# Patient Record
Sex: MALE | Race: WHITE | NOT HISPANIC OR LATINO
[De-identification: names, ages, dates, MRNs, and addresses within clinical notes are randomized per-mention and may not be internally consistent; named-entity substitution may affect disease eponyms.]

---

## 2017-10-23 PROBLEM — Z00.00 ENCOUNTER FOR PREVENTIVE HEALTH EXAMINATION: Status: ACTIVE | Noted: 2017-10-23

## 2017-11-01 ENCOUNTER — APPOINTMENT (OUTPATIENT)
Dept: NEUROLOGY | Facility: CLINIC | Age: 57
End: 2017-11-01
Payer: MEDICAID

## 2017-11-01 VITALS
BODY MASS INDEX: 29.99 KG/M2 | SYSTOLIC BLOOD PRESSURE: 140 MMHG | DIASTOLIC BLOOD PRESSURE: 90 MMHG | WEIGHT: 180 LBS | HEIGHT: 65 IN

## 2017-11-01 DIAGNOSIS — Z86.39 PERSONAL HISTORY OF OTHER ENDOCRINE, NUTRITIONAL AND METABOLIC DISEASE: ICD-10-CM

## 2017-11-01 DIAGNOSIS — Z87.891 PERSONAL HISTORY OF NICOTINE DEPENDENCE: ICD-10-CM

## 2017-11-01 DIAGNOSIS — Z78.9 OTHER SPECIFIED HEALTH STATUS: ICD-10-CM

## 2017-11-01 PROCEDURE — 99213 OFFICE O/P EST LOW 20 MIN: CPT

## 2017-11-01 RX ORDER — SIMVASTATIN 40 MG/1
40 TABLET, FILM COATED ORAL
Refills: 0 | Status: ACTIVE | COMMUNITY

## 2018-05-02 ENCOUNTER — INPATIENT (INPATIENT)
Facility: HOSPITAL | Age: 58
LOS: 6 days | Discharge: NURSING, REHAB OR LONG-TERM CARE | End: 2018-05-09
Attending: NEUROLOGICAL SURGERY | Admitting: NEUROLOGICAL SURGERY
Payer: MEDICAID

## 2018-05-02 VITALS
HEIGHT: 69 IN | TEMPERATURE: 98 F | DIASTOLIC BLOOD PRESSURE: 100 MMHG | RESPIRATION RATE: 16 BRPM | OXYGEN SATURATION: 100 % | WEIGHT: 175.05 LBS | HEART RATE: 91 BPM | SYSTOLIC BLOOD PRESSURE: 141 MMHG

## 2018-05-02 DIAGNOSIS — G61.82 MULTIFOCAL MOTOR NEUROPATHY: ICD-10-CM

## 2018-05-02 DIAGNOSIS — E78.5 HYPERLIPIDEMIA, UNSPECIFIED: ICD-10-CM

## 2018-05-02 DIAGNOSIS — G47.01 INSOMNIA DUE TO MEDICAL CONDITION: ICD-10-CM

## 2018-05-02 DIAGNOSIS — I62.9 NONTRAUMATIC INTRACRANIAL HEMORRHAGE, UNSPECIFIED: ICD-10-CM

## 2018-05-02 DIAGNOSIS — F32.9 MAJOR DEPRESSIVE DISORDER, SINGLE EPISODE, UNSPECIFIED: ICD-10-CM

## 2018-05-02 LAB
ABO RH CONFIRMATION: SIGNIFICANT CHANGE UP
ALBUMIN SERPL ELPH-MCNC: 2.6 G/DL — LOW (ref 3.3–5)
ALP SERPL-CCNC: 77 U/L — SIGNIFICANT CHANGE UP (ref 40–120)
ALT FLD-CCNC: 68 U/L — SIGNIFICANT CHANGE UP (ref 12–78)
ANION GAP SERPL CALC-SCNC: 9 MMOL/L — SIGNIFICANT CHANGE UP (ref 5–17)
APTT BLD: 29.1 SEC — SIGNIFICANT CHANGE UP (ref 27.5–37.4)
AST SERPL-CCNC: 55 U/L — HIGH (ref 15–37)
BASOPHILS # BLD AUTO: 0.05 K/UL — SIGNIFICANT CHANGE UP (ref 0–0.2)
BASOPHILS NFR BLD AUTO: 0.4 % — SIGNIFICANT CHANGE UP (ref 0–2)
BILIRUB SERPL-MCNC: 0.3 MG/DL — SIGNIFICANT CHANGE UP (ref 0.2–1.2)
BLD GP AB SCN SERPL QL: SIGNIFICANT CHANGE UP
BUN SERPL-MCNC: 24 MG/DL — HIGH (ref 7–23)
CALCIUM SERPL-MCNC: 8.7 MG/DL — SIGNIFICANT CHANGE UP (ref 8.5–10.1)
CHLORIDE SERPL-SCNC: 102 MMOL/L — SIGNIFICANT CHANGE UP (ref 96–108)
CO2 SERPL-SCNC: 27 MMOL/L — SIGNIFICANT CHANGE UP (ref 22–31)
CREAT SERPL-MCNC: 0.78 MG/DL — SIGNIFICANT CHANGE UP (ref 0.5–1.3)
EOSINOPHIL # BLD AUTO: 0.12 K/UL — SIGNIFICANT CHANGE UP (ref 0–0.5)
EOSINOPHIL NFR BLD AUTO: 0.9 % — SIGNIFICANT CHANGE UP (ref 0–6)
GLUCOSE SERPL-MCNC: 177 MG/DL — HIGH (ref 70–99)
HCT VFR BLD CALC: 47.2 % — SIGNIFICANT CHANGE UP (ref 39–50)
HGB BLD-MCNC: 15.5 G/DL — SIGNIFICANT CHANGE UP (ref 13–17)
IMM GRANULOCYTES NFR BLD AUTO: 0.3 % — SIGNIFICANT CHANGE UP (ref 0–1.5)
INR BLD: 1 RATIO — SIGNIFICANT CHANGE UP (ref 0.88–1.16)
LYMPHOCYTES # BLD AUTO: 17 % — SIGNIFICANT CHANGE UP (ref 13–44)
LYMPHOCYTES # BLD AUTO: 2.3 K/UL — SIGNIFICANT CHANGE UP (ref 1–3.3)
MAGNESIUM SERPL-MCNC: 2 MG/DL — SIGNIFICANT CHANGE UP (ref 1.6–2.6)
MCHC RBC-ENTMCNC: 29.8 PG — SIGNIFICANT CHANGE UP (ref 27–34)
MCHC RBC-ENTMCNC: 32.8 GM/DL — SIGNIFICANT CHANGE UP (ref 32–36)
MCV RBC AUTO: 90.6 FL — SIGNIFICANT CHANGE UP (ref 80–100)
MONOCYTES # BLD AUTO: 1.31 K/UL — HIGH (ref 0–0.9)
MONOCYTES NFR BLD AUTO: 9.7 % — SIGNIFICANT CHANGE UP (ref 2–14)
NEUTROPHILS # BLD AUTO: 9.69 K/UL — HIGH (ref 1.8–7.4)
NEUTROPHILS NFR BLD AUTO: 71.7 % — SIGNIFICANT CHANGE UP (ref 43–77)
NRBC # BLD: 0 /100 WBCS — SIGNIFICANT CHANGE UP (ref 0–0)
PLATELET # BLD AUTO: 306 K/UL — SIGNIFICANT CHANGE UP (ref 150–400)
POTASSIUM SERPL-MCNC: 3.6 MMOL/L — SIGNIFICANT CHANGE UP (ref 3.5–5.3)
POTASSIUM SERPL-SCNC: 3.6 MMOL/L — SIGNIFICANT CHANGE UP (ref 3.5–5.3)
PROT SERPL-MCNC: 9.9 GM/DL — HIGH (ref 6–8.3)
PROTHROM AB SERPL-ACNC: 10.8 SEC — SIGNIFICANT CHANGE UP (ref 9.8–12.7)
RBC # BLD: 5.21 M/UL — SIGNIFICANT CHANGE UP (ref 4.2–5.8)
RBC # FLD: 12.7 % — SIGNIFICANT CHANGE UP (ref 10.3–14.5)
SODIUM SERPL-SCNC: 138 MMOL/L — SIGNIFICANT CHANGE UP (ref 135–145)
TROPONIN I SERPL-MCNC: 0.02 NG/ML — SIGNIFICANT CHANGE UP (ref 0.01–0.04)
TYPE + AB SCN PNL BLD: SIGNIFICANT CHANGE UP
WBC # BLD: 13.51 K/UL — HIGH (ref 3.8–10.5)
WBC # FLD AUTO: 13.51 K/UL — HIGH (ref 3.8–10.5)

## 2018-05-02 PROCEDURE — 70496 CT ANGIOGRAPHY HEAD: CPT | Mod: 26

## 2018-05-02 PROCEDURE — 93010 ELECTROCARDIOGRAM REPORT: CPT

## 2018-05-02 PROCEDURE — 71045 X-RAY EXAM CHEST 1 VIEW: CPT | Mod: 26,77

## 2018-05-02 PROCEDURE — 71045 X-RAY EXAM CHEST 1 VIEW: CPT | Mod: 26

## 2018-05-02 PROCEDURE — 99285 EMERGENCY DEPT VISIT HI MDM: CPT

## 2018-05-02 PROCEDURE — 70450 CT HEAD/BRAIN W/O DYE: CPT | Mod: 26,59

## 2018-05-02 PROCEDURE — 70450 CT HEAD/BRAIN W/O DYE: CPT | Mod: 26,77,59

## 2018-05-02 PROCEDURE — 99223 1ST HOSP IP/OBS HIGH 75: CPT

## 2018-05-02 RX ORDER — SENNA PLUS 8.6 MG/1
2 TABLET ORAL AT BEDTIME
Qty: 0 | Refills: 0 | Status: DISCONTINUED | OUTPATIENT
Start: 2018-05-02 | End: 2018-05-09

## 2018-05-02 RX ORDER — ACETAMINOPHEN 500 MG
325 TABLET ORAL EVERY 4 HOURS
Qty: 0 | Refills: 0 | Status: DISCONTINUED | OUTPATIENT
Start: 2018-05-02 | End: 2018-05-09

## 2018-05-02 RX ORDER — ONDANSETRON 8 MG/1
4 TABLET, FILM COATED ORAL EVERY 6 HOURS
Qty: 0 | Refills: 0 | Status: DISCONTINUED | OUTPATIENT
Start: 2018-05-02 | End: 2018-05-09

## 2018-05-02 RX ORDER — NICARDIPINE HYDROCHLORIDE 30 MG/1
5 CAPSULE, EXTENDED RELEASE ORAL
Qty: 40 | Refills: 0 | Status: DISCONTINUED | OUTPATIENT
Start: 2018-05-02 | End: 2018-05-05

## 2018-05-02 RX ORDER — SODIUM CHLORIDE 9 MG/ML
1000 INJECTION INTRAMUSCULAR; INTRAVENOUS; SUBCUTANEOUS
Qty: 0 | Refills: 0 | Status: DISCONTINUED | OUTPATIENT
Start: 2018-05-02 | End: 2018-05-04

## 2018-05-02 RX ORDER — DOCUSATE SODIUM 100 MG
100 CAPSULE ORAL THREE TIMES A DAY
Qty: 0 | Refills: 0 | Status: DISCONTINUED | OUTPATIENT
Start: 2018-05-02 | End: 2018-05-09

## 2018-05-02 RX ORDER — MIRTAZAPINE 45 MG/1
30 TABLET, ORALLY DISINTEGRATING ORAL AT BEDTIME
Qty: 0 | Refills: 0 | Status: DISCONTINUED | OUTPATIENT
Start: 2018-05-02 | End: 2018-05-09

## 2018-05-02 RX ORDER — IMMUNE GLOBULIN,GAMMA(IGG) 5 %
0 VIAL (ML) INTRAVENOUS
Qty: 0 | Refills: 0 | COMMUNITY

## 2018-05-02 RX ORDER — PANTOPRAZOLE SODIUM 20 MG/1
40 TABLET, DELAYED RELEASE ORAL DAILY
Qty: 0 | Refills: 0 | Status: DISCONTINUED | OUTPATIENT
Start: 2018-05-02 | End: 2018-05-09

## 2018-05-02 RX ORDER — FOLIC ACID 0.8 MG
1 TABLET ORAL DAILY
Qty: 0 | Refills: 0 | Status: DISCONTINUED | OUTPATIENT
Start: 2018-05-02 | End: 2018-05-09

## 2018-05-02 RX ORDER — SIMVASTATIN 20 MG/1
20 TABLET, FILM COATED ORAL AT BEDTIME
Qty: 0 | Refills: 0 | Status: DISCONTINUED | OUTPATIENT
Start: 2018-05-02 | End: 2018-05-03

## 2018-05-02 RX ADMIN — SODIUM CHLORIDE 75 MILLILITER(S): 9 INJECTION INTRAMUSCULAR; INTRAVENOUS; SUBCUTANEOUS at 21:57

## 2018-05-02 NOTE — CONSULT NOTE ADULT - SUBJECTIVE AND OBJECTIVE BOX
The patient is a 58 male with a ? upper motor neuron dieease, an Immunodiffency he recieves IVIG for [id number], HLD who was weaker than normal starting this past sunday.  He was sent in by an outpatient physician.  CTH showed a L ICH with IVH.  PAtient feels he may be slightly mor weaker on the Right and is more confused      PMH: As above  PSH: He denies  SH: Smoker  FH: NC  ALL: NKDA

## 2018-05-02 NOTE — H&P ADULT - HISTORY OF PRESENT ILLNESS
57 y/o male with PMHx of 'MultiFocal Motor Neuropathy' Dx'd in 1992, HLD, Depression.  Pt presents to the ED with word finding difficulties, recent fall at home, and increased Right sided weakness.  Pt states he fell Sunday, denies hitting head or LOC but believes this is when his symptoms began with word finding, gait dysfunction, and increased balance problems. Pt noted to have increased right sided weakness, + difficulty word finding, +imbalance per Aide who takes care of patient starting on Mondays for 4 hours and also when he visited his psychiatrist this afternoon on 5/2/2018.  Pt sent to ED for evaluation.  CTH demonstrates L Thalamic ICH, with intraventricular extension - no hydrocephalus.    Hemorrhagic Stroke : NIHSS of 4  ICH Score : 1 (due to Intraventricular hemorrhage)    PAST MEDICAL & SURGICAL HISTORY:  Multifocal Motor Neuropathy - q 10 days IVIG infusions (2 day dosing) - Improves and maintains patients motor power - ambulatory status.  Hyperlipidemia  Depression  Hx of Right Wrist Fracture  Hx of Right foot/ankle fracture    FAMILY HISTORY:  Noncontributory     Social Hx:  Nonsmoker, no drug or alcohol use  Pt required Home care aide / services (4 hours per day) 5 days a week.  Brother A&Well  Mother Hx of Lymphoma recently treated  Father with spinal tumor, paraplegia / presently hospitalized.    Allergies  No Known Allergies      MEDICATIONS  (STANDING):  LORazepam   Injectable 2 milliGRAM(s) IV Push Once  niCARdipine Infusion 5 mG/Hr (25 mL/Hr) IV Continuous <Continuous>    MEDICATIONS (OUTPATIENT)  IVIG INFUSION Q 10 DAYS (2 DAY DOSING REGIMENT)  SIMVASTATIN 20 MG DAILY  REMERON 3MG QHS  ANTI-DEPRESSENT (10MG QHS)  LIPOFLAVINOIDE         ROS: Pertinent positives in HPI, all other ROS negative    Vital Signs Last 24 Hrs  T(C): 36.5 (02 May 2018 14:09), Max: 36.5 (02 May 2018 14:09)  T(F): 97.7 (02 May 2018 14:09), Max: 97.7 (02 May 2018 14:09)  HR: 82 (02 May 2018 15:45) (82 - 91)  BP: 141/94 (02 May 2018 15:50) (137/82 - 167/87)  BP(mean): --  RR: 16 (02 May 2018 14:09) (16 - 16)  SpO2: 100% (02 May 2018 14:09) (100% - 100%)    Labs:                        15.5   13.51 )-----------( 306      ( 02 May 2018 14:00 )             47.2     05-02    138  |  102  |  24<H>  ----------------------------<  177<H>  3.6   |  27  |  0.78    Ca    8.7      02 May 2018 14:00  Mg     2.0     05-02    TPro  9.9<H>  /  Alb  2.6<L>  /  TBili  0.3  /  DBili  x   /  AST  55<H>  /  ALT  68  /  AlkPhos  77  05-02    Coags pending:     Radiology report:  - CT head: Left Thalamic ICH with IVH. No hydro  - CTA brain: no avm or vascular malformation / no aneurysm.    Physical Exam:  Constitutional: Awake / alert, repeats easily & clearly.  Difficulty naming objects 'Pen,Cup,Mouse'  HEENT: PERRLA, EOMI  Neck: Supple  Respiratory: Breath sounds are clear bilaterally  Cardiovascular: S1 and S2, regular rhythm  Gastrointestinal: Soft, NT/ND  Extremities:  no edema  Vascular: No carotid Bruit  Musculoskeletal: no joint swelling/tenderness, no abnormal movements  Skin: No rashes    Neurological Exam:  HF: A x Awake, Orientation x 3 with guidance. Appropriately interactive, normal affect, speech fluent, + cortical aphasia with word finding difficulties. Naming /repetition intact . Pt able to follow complex two step commands.  CN: PERRL, EOMI, VFF, facial sensation normal, no NLFD, tongue midline  Motor:   RUE Delt 4/5, Bic/Tric 3+/5. Wrist drop, 1/5 WF/WE,/Intrins secondary to Motor neuron dz.  LUE Delt 5/5, Bic/Tric 4/5. Wrist drop, 1/5 WF/WE,/Intrins secondary to Motor neuron dz.  B/L LE's: 4/5 power throughout / IP/H/Q/DF/PF  Sens: Intact to light touch  Coord:  LUE thumb to ear without difficulty. Poor coord w/ RUE secondary to ICH.   Gait/Balance: Cannot test    A/P: 58 M with Mutlifocal Motor Neuropathy, and acute Left Thalamic ICH with intraventricular extension, presumed to have began on 4/29.  Pt evaluated by psychiatrist who noticed word finding issues / gait decline and sent to ED to r/o CVA.  Pt noted to have Left Thalamic ICH and IVH.  Pt's NIHSS 4.    - Stat CTA to r/o vascular malformation - Negative  - Repeat CTH in 6-8 hours to ensure no progression has occurred, then repeat in AM  - Keep sbp < 160 , Cardene gtt rx'd  - No acute neurosurgical intervention  - Neurology consult recommend  - Admit to ICU via medical / hospitalist team accordingly  - HOB 30 degrees, Neuro checks q 1 hr, No antiszr meds required, avoid narcotics if c/o HA pain  - f/u coags in ED, Obtain T&Screen  - d/w Dr. Chino accordingly - will admit to ICU and monitor for next 24 hours.  - Critical Care team informed of need for admission to Unit with close monitoring of neuro status and blood pressure.

## 2018-05-02 NOTE — CONSULT NOTE ADULT - SUBJECTIVE AND OBJECTIVE BOX
NEUROSURGERY EVALUATION:    HPI:  59 y/o male with PMHx of motor neuron disease, suspect ALS, presents to the ED c/o AMS. Home health Aide states he has been having trouble finding words and imbalance since Monday, has not noticed any new weakness. Pt states he fell Sunday, denies hitting head or LOC but believes this is when his symptoms began with word finding, gait dysfunction, and increased balance problems. Pt noted to have increased right sided weakness, + difficulty word finding, +imbalance when he visited his psychiatrist this afternoon on 5/2/2018.  Pt sent to ED for evaluation.  CTH demonstrates L Thalamic ICH, with intraventricular extension - no hydrocephalus.    PAST MEDICAL & SURGICAL HISTORY:  ALS - weekly infusions per Patient.    FAMILY HISTORY:  Noncontributory     Social Hx:  Nonsmoker, no drug or alcohol use  Pt required Home care aide / services.  Allergies    No Known Allergies  Intolerances    MEDICATIONS  (STANDING):  LORazepam   Injectable 2 milliGRAM(s) IV Push Once  niCARdipine Infusion 5 mG/Hr (25 mL/Hr) IV Continuous <Continuous>       ROS: Pertinent positives in HPI, all other ROS negative    Vital Signs Last 24 Hrs  T(C): 36.5 (02 May 2018 14:09), Max: 36.5 (02 May 2018 14:09)  T(F): 97.7 (02 May 2018 14:09), Max: 97.7 (02 May 2018 14:09)  HR: 82 (02 May 2018 15:45) (82 - 91)  BP: 141/94 (02 May 2018 15:50) (137/82 - 167/87)  BP(mean): --  RR: 16 (02 May 2018 14:09) (16 - 16)  SpO2: 100% (02 May 2018 14:09) (100% - 100%)    Labs:                        15.5   13.51 )-----------( 306      ( 02 May 2018 14:00 )             47.2     05-02    138  |  102  |  24<H>  ----------------------------<  177<H>  3.6   |  27  |  0.78    Ca    8.7      02 May 2018 14:00  Mg     2.0     05-02    TPro  9.9<H>  /  Alb  2.6<L>  /  TBili  0.3  /  DBili  x   /  AST  55<H>  /  ALT  68  /  AlkPhos  77  05-02    Coags pending:     Radiology report:  - CT head: Left Thalamic ICH with IVH. No hydro    Physical Exam:  Constitutional: Awake / alert, repeats easily & clearly.  Difficulty naming objects 'Pen,Cup,Mouse'  HEENT: PERRLA, EOMI  Neck: Supple  Respiratory: Breath sounds are clear bilaterally  Cardiovascular: S1 and S2, regular rhythm  Gastrointestinal: Soft, NT/ND  Extremities:  no edema  Vascular: No carotid Bruit  Musculoskeletal: no joint swelling/tenderness, no abnormal movements  Skin: No rashes    Neurological Exam:  HF: A x Awake, Orientation x 3 with guidance. Appropriately interactive, normal affect, speech fluent, + cortical aphasia with word finding difficulties. Naming /repetition intact . Pt able to follow complex two step commands.  CN: PERRL, EOMI, VFF, facial sensation normal, no NLFD, tongue midline  Motor:   RUE Delt 4/5, Bic/Tric 3+/5. Wrist drop, 1/5 WF/WE,/Intrins secondary to Motor neuron dz.  LUE Delt 5/5, Bic/Tric 4/5. Wrist drop, 1/5 WF/WE,/Intrins secondary to Motor neuron dz.  B/L LE's: 4/5 power throughout / IP/H/Q/DF/PF  Sens: Intact to light touch  Coord:  LUE thumb to ear without difficulty. Poor coord w/ RUE secondary to ICH.   Gait/Balance: Cannot test    A/P: 58 M with motor neuro dz, suspect ALS with acute Left Thalamic ICH with intraventricular extension, presumed to have began on 4/29.  Pt evaluated by psychiatrist who noticed word finding issues / gait decline and sent to ED to r/o CVA.  Pt noted to have ICH.    - Stat CTA to r/o vascular malformation  - Repeat CTH in 6-8 hours to ensure no progression has occurred, then repeat in AM  - Keep sbp < 160 , Cardene gtt rx'd  - No acute neurosurgical intervention  - Neurology consult recommend  - Admit to ICU via medical / hospitalist team accordingly  - HOB 30 degrees, Neuro checks q 1 hr, No antiszr meds required, avoid narcotics if c/o HA pain  - f/u coags in ED, Obtain T&Screen  - d/w Dr. Chino accordingly - will follow as a consultant     Total Critical Care Time spent:

## 2018-05-02 NOTE — H&P ADULT - PROBLEM SELECTOR PLAN 2
- plan for IVIG infusion by day 10 for a 2 day course  - Next dosing regiment is 10th & 11th of May  - Neurology consult to confirm patients dosing regiment and management of MMN.

## 2018-05-02 NOTE — ED PROVIDER NOTE - MUSCULOSKELETAL, MLM
Spine appears normal, range of motion is not limited, no muscle or joint tenderness. bilat wrist and foot drop

## 2018-05-02 NOTE — ED ADULT NURSE NOTE - OBJECTIVE STATEMENT
Pt sent from MD office for word-finding problems.  Pt with motor neuropathy, treated with IVIG.  Pt alert, cooperative, difficult to understand.  Aide from [facility] at bedside.  Cardiac and VS monitoring initiated.  20g PIV placed in L wrist.

## 2018-05-02 NOTE — ED PROVIDER NOTE - MEDICAL DECISION MAKING DETAILS
CT with left thalamic hemorrhage, with IVH.  Neurosurgery Dr. Chino consulted.  Cardene drip ordered for goal BP < 160, however 's at the moment.  Signout to Dr. Mcconnell pending admission to ICU.

## 2018-05-02 NOTE — H&P ADULT - NSHPOUTPATIENTPROVIDERS_GEN_ALL_CORE
Dr. Saige Cobb MD - Psychiatrist  Dr. Gilman - Neurologist - [place]  Vikash Shaffer - [facility] Neurologist (sees 4x/year for IVIG infusion therapy)

## 2018-05-02 NOTE — ED PROVIDER NOTE - OBJECTIVE STATEMENT
59 y/o male with PMHx of neuropathy presents to the ED c/o AMS. Aide states he has been having trouble finding words and imbalance since yesterday, has not noticed any new weakness. Pt states he fell Sunday, denies hitting head or LOC.

## 2018-05-02 NOTE — CONSULT NOTE ADULT - CONSULT REASON
amyotrophic lateral sclerosis with suspected L thalamic ICH with intraventricular extension, presumed to have occurred on April 29th.

## 2018-05-02 NOTE — H&P ADULT - ASSESSMENT
A/P: 58 M with Mutlifocal Motor Neuropathy, requiring IVIG infusions q10 days for motor power preservation, and acute Left Thalamic ICH with intraventricular extension, presumed to have began on 4/29.  Pt evaluated by psychiatrist who noticed word finding issues / gait decline and sent to ED to r/o CVA.  Pt noted to have Left Thalamic ICH and IVH.  Pt's NIHSS 4.

## 2018-05-02 NOTE — ED PROVIDER NOTE - NEUROLOGICAL, MLM
Alert and oriented, no focal deficits, no motor or sensory deficits. No asphasia or dysarthria noted on exam

## 2018-05-02 NOTE — ED ADULT TRIAGE NOTE - CHIEF COMPLAINT QUOTE
Pt. to the ED BIBA C/o Weakness and confusion since this morning with inability to bring words put- Dr. Ryan to the bedside to examine patient- Co de stroke not indicated- + Psych hx-

## 2018-05-02 NOTE — H&P ADULT - PROBLEM SELECTOR PLAN 1
- Stat CTA to r/o vascular malformation - Negative  - Repeat CTH in 6-8 hours to ensure no progression has occurred, then repeat in AM  - Keep sbp < 160 , Cardene gtt rx'd  - No acute neurosurgical intervention  - Neurology consult recommend  - Admit to ICU via medical / hospitalist team accordingly  - HOB 30 degrees, Neuro checks q 1 hr, No antiszr meds required, avoid narcotics if c/o HA pain  - f/u coags in ED, Obtain T&Screen  - d/w Dr. Chino accordingly - will admit to ICU and monitor for next 24 hours.  - Critical Care team informed of need for admission to Unit with close monitoring of neuro status and blood pressure.  - PT/OT eval in am  - dispo planning (rehab vs return to home with greater care needs)

## 2018-05-03 DIAGNOSIS — G62.9 POLYNEUROPATHY, UNSPECIFIED: ICD-10-CM

## 2018-05-03 LAB
HBA1C BLD-MCNC: 8.7 % — HIGH (ref 4–5.6)
MAGNESIUM SERPL-MCNC: 2 MG/DL — SIGNIFICANT CHANGE UP (ref 1.6–2.6)
TSH SERPL-MCNC: 1.82 UU/ML — SIGNIFICANT CHANGE UP (ref 0.36–3.74)

## 2018-05-03 PROCEDURE — 99223 1ST HOSP IP/OBS HIGH 75: CPT

## 2018-05-03 PROCEDURE — 93010 ELECTROCARDIOGRAM REPORT: CPT

## 2018-05-03 PROCEDURE — 99232 SBSQ HOSP IP/OBS MODERATE 35: CPT

## 2018-05-03 PROCEDURE — 70450 CT HEAD/BRAIN W/O DYE: CPT | Mod: 26

## 2018-05-03 RX ORDER — AMLODIPINE BESYLATE 2.5 MG/1
5 TABLET ORAL DAILY
Qty: 0 | Refills: 0 | Status: DISCONTINUED | OUTPATIENT
Start: 2018-05-03 | End: 2018-05-09

## 2018-05-03 RX ORDER — LISINOPRIL 2.5 MG/1
5 TABLET ORAL DAILY
Qty: 0 | Refills: 0 | Status: DISCONTINUED | OUTPATIENT
Start: 2018-05-03 | End: 2018-05-09

## 2018-05-03 RX ORDER — INFLUENZA VIRUS VACCINE 15; 15; 15; 15 UG/.5ML; UG/.5ML; UG/.5ML; UG/.5ML
0.5 SUSPENSION INTRAMUSCULAR ONCE
Qty: 0 | Refills: 0 | Status: COMPLETED | OUTPATIENT
Start: 2018-05-03 | End: 2018-05-03

## 2018-05-03 RX ORDER — SIMVASTATIN 20 MG/1
20 TABLET, FILM COATED ORAL AT BEDTIME
Qty: 0 | Refills: 0 | Status: DISCONTINUED | OUTPATIENT
Start: 2018-05-04 | End: 2018-05-09

## 2018-05-03 RX ADMIN — PANTOPRAZOLE SODIUM 40 MILLIGRAM(S): 20 TABLET, DELAYED RELEASE ORAL at 12:49

## 2018-05-03 RX ADMIN — SODIUM CHLORIDE 75 MILLILITER(S): 9 INJECTION INTRAMUSCULAR; INTRAVENOUS; SUBCUTANEOUS at 12:11

## 2018-05-03 RX ADMIN — MIRTAZAPINE 30 MILLIGRAM(S): 45 TABLET, ORALLY DISINTEGRATING ORAL at 21:14

## 2018-05-03 RX ADMIN — LISINOPRIL 5 MILLIGRAM(S): 2.5 TABLET ORAL at 11:17

## 2018-05-03 RX ADMIN — Medication 100 MILLIGRAM(S): at 14:48

## 2018-05-03 RX ADMIN — AMLODIPINE BESYLATE 5 MILLIGRAM(S): 2.5 TABLET ORAL at 12:49

## 2018-05-03 RX ADMIN — NICARDIPINE HYDROCHLORIDE 25 MG/HR: 30 CAPSULE, EXTENDED RELEASE ORAL at 08:31

## 2018-05-03 RX ADMIN — Medication 100 MILLIGRAM(S): at 06:15

## 2018-05-03 RX ADMIN — SIMVASTATIN 20 MILLIGRAM(S): 20 TABLET, FILM COATED ORAL at 14:48

## 2018-05-03 RX ADMIN — Medication 1 MILLIGRAM(S): at 12:49

## 2018-05-03 RX ADMIN — Medication 100 MILLIGRAM(S): at 21:14

## 2018-05-03 RX ADMIN — Medication 1 TABLET(S): at 12:49

## 2018-05-03 NOTE — PHYSICAL THERAPY INITIAL EVALUATION ADULT - CRITERIA FOR SKILLED THERAPEUTIC INTERVENTIONS
functional limitations in following categories/predicted duration of therapy intervention/anticipated equipment needs at discharge/risk reduction/prevention/rehab potential/impairments found/therapy frequency

## 2018-05-03 NOTE — CONSULT NOTE ADULT - SUBJECTIVE AND OBJECTIVE BOX
HPI:  59 y/o male with PMHx of 'MultiFocal Motor Neuropathy' Dx'd in 1992, HLD, Depression.  Pt presents to the ED with word finding difficulties, recent fall at home, and increased Right sided weakness.  Pt states he fell Sunday, denies hitting head or LOC but believes this is when his symptoms began with word finding, gait dysfunction, and increased balance problems. Pt noted to have increased right sided weakness, + difficulty word finding, +imbalance per Aide who takes care of patient starting on Mondays for 4 hours and also when he visited his psychiatrist this afternoon on 5/2/2018.  Pt sent to ED for evaluation.  CTH demonstrates L Thalamic ICH, with intraventricular extension - no hydrocephalus.    Hemorrhagic Stroke : NIHSS of 4  ICH Score : 1 (due to Intraventricular hemorrhage)    PAST MEDICAL & SURGICAL HISTORY:  Multifocal Motor Neuropathy - q 10 days IVIG infusions (2 day dosing) - Improves and maintains patients motor power - ambulatory status.  Hyperlipidemia  Depression  Hx of Right Wrist Fracture  Hx of Right foot/ankle fracture    FAMILY HISTORY:  Noncontributory     Social Hx:  Nonsmoker, no drug or alcohol use  Pt required Home care aide / services (4 hours per day) 5 days a week.  Brother A&Well  Mother Hx of Lymphoma recently treated  Father with spinal tumor, paraplegia / presently hospitalized.    Allergies  No Known Allergies      MEDICATIONS  (STANDING):  LORazepam   Injectable 2 milliGRAM(s) IV Push Once  niCARdipine Infusion 5 mG/Hr (25 mL/Hr) IV Continuous <Continuous>    MEDICATIONS (OUTPATIENT)  IVIG INFUSION Q 10 DAYS (2 DAY DOSING REGIMENT)  SIMVASTATIN 20 MG DAILY  REMERON 3MG QHS  ANTI-DEPRESSENT (10MG QHS)  LIPOFLAVINOIDE    PAST MEDICAL & SURGICAL HISTORY:  Neuropathy    FAMILY HISTORY:  NCAT    Social Hx:  Nonsmoker, no drug or alcohol use  Medications and Allergies ReviewedMEDICATIONS  (STANDING):  amLODIPine   Tablet 5 milliGRAM(s) Oral daily  docusate sodium 100 milliGRAM(s) Oral three times a day  folic acid 1 milliGRAM(s) Oral daily  influenza   Vaccine 0.5 milliLiter(s) IntraMuscular once  lisinopril 5 milliGRAM(s) Oral daily  mirtazapine 30 milliGRAM(s) Oral at bedtime  multivitamin 1 Tablet(s) Oral daily  niCARdipine Infusion 5 mG/Hr (25 mL/Hr) IV Continuous <Continuous>  pantoprazole    Tablet 40 milliGRAM(s) Oral daily  simvastatin Oral Tab/Cap - Peds 20 milliGRAM(s) Oral daily  sodium chloride 0.9%. 1000 milliLiter(s) (75 mL/Hr) IV Continuous <Continuous>     ROS: Pertinent positives in HPI, all other ROS were reviewed and are negative.      Examination:   Vital Signs Last 24 Hrs  T(C): 36.7 (03 May 2018 10:00), Max: 36.7 (03 May 2018 10:00)  T(F): 98.1 (03 May 2018 10:00), Max: 98.1 (03 May 2018 10:00)  HR: 85 (03 May 2018 12:30) (67 - 98)  BP: 120/73 (03 May 2018 12:30) (111/69 - 173/111)  BP(mean): 85 (03 May 2018 12:30) (78 - 120)  RR: 18 (03 May 2018 12:30) (12 - 21)  SpO2: 97% (03 May 2018 12:30) (91% - 100%)  General: Cooperative, NAD   NECK: supple, no masses  ENT: Normal hearing   Vascular : no carotid bruits,   Lungs: CTAB  Chest: RRR, no murmurs  Extremities: nontender, no edema  Musculoskeletal: no adventitious movements, no joint stiffness  Skin: no rash      Neurological Exam:  HF:  Awake, Orientation x 3 with guidance. Appropriately interactive, normal affect, speech fluent, word finding difficulties, can repeat . Pt able to follow complex two step commands.  CN: PERRL, EOMI, VFF, facial sensation normal, no NLFD, tongue midline  Motor: distal wasting, weakness more distal than proximal. UE prox: 4/5, distal 0/5  B/L LE's: 4-/5 power proximal. distal ~2/5. Toes mute  sensation: reduced distally to ST/ PP, JPS.  Coord:  limited due to weakness, left better than right F>N.   Gait/Balance: Cannot test    < from: CT Head No Cont (05.02.18 @ 22:39) >  FINDINGS:   No previous examinations are available for review.    The brain demonstrates grossly stable thalamic hemorrhage with   intraventricular extension. No hydrocephalus.   No acute cerebral   cortical infarct is seen.  No intracranial hemorrhage is found.  The ventricles, sulci and basal cisterns appear unremarkable.         The orbits are unremarkable.  The paranasal sinuses are clear.  The nasal   cavity appears intact.  The nasopharynx is symmetric.  The central skull   base, petrous temporal bones and calvarium remain intact.      IMPRESSION:   Grossly stable thalamic hemorrhage with intraventricular   extension. No hydrocephalus    < from: CT Angio Head w/ IV Cont (05.02.18 @ 16:52) >  IMPRESSION:          1.   Right carotid system:  No hemodynamically significant stenosis.          2.   Left carotid system:  No hemodynamically significant stenosis.           3.   Intracranial circulation:  No significant vascular lesion.             4.   Brain:   3 cm hemorrhage in the LEFT thalamus with extension   into the LEFT body of the LEFT lateral ventricle    < end of copied text >

## 2018-05-03 NOTE — PROGRESS NOTE ADULT - ASSESSMENT
A/P: 58 male who is presenting with a L ICH/IVH      Plan:  ICU  Keep SBP < 150--will add Norvasc  Cardene as needed  Swallow eval  Repeat HCT today  Neurochecks Q1h  No Hydro on HCT

## 2018-05-03 NOTE — SWALLOW BEDSIDE ASSESSMENT ADULT - SWALLOW EVAL: RECOMMENDED DIET
SUGGEST A REGULAR TEXTURE DIET WITH THIN LIQUID CONSISTENCIES AS TOLERATED. THE PT APPEARED CLINICALLY TOLERANT OF THESE FOOD TEXTURES FROM AN OROPHARYNGEAL SWALLOWING STANCE ON CLINICAL EXAM.

## 2018-05-03 NOTE — SWALLOW BEDSIDE ASSESSMENT ADULT - SWALLOW EVAL: CRITERIA FOR SKILLED INTERVENTION MET
No need for swallowing therapy and he is able to verbalize his intents/states he is at communicative baseline. As such, I do not feel that acute speech path intervention is clinically warranted nor would it . Thus, WILL NOT ACTIVELY FOLLOW. RECONSULT PRN AS NEEDED.

## 2018-05-03 NOTE — PROGRESS NOTE ADULT - ASSESSMENT
A/P: 58 M with Multifocal Motor Neuropathy, and acute Left Thalamic ICH with intraventricular extension, presumed to have began on 4/29.  Pt evaluated by psychiatrist who noticed word finding issues / gait decline and sent to ED to r/o CVA.  Pt noted to have Left Thalamic ICH and IVH.  Pt's NIHSS 4.    Repeat CT stable, pt currently on Cardene infusion for BP    PLAN-  No acute neurosurgical intervention  Change Neuro check to Q4 hours   Maintain SBP < 150, transition to PO BP Meds  Speech pathology consult-- for aphasia   Physical Therapy consult   Social Work consult for dispo planning  Medicine consult when stable to leave to leave the ICU  Neurology Consult for management of MultiFocal Motor Neuropathy, pt requires IVIG every 10 days, last given 4/29

## 2018-05-03 NOTE — SWALLOW BEDSIDE ASSESSMENT ADULT - SWALLOW EVAL: DIAGNOSIS
1) the patient demonstrates reduced UE dexterity when feeding due to longstanding neuropathy/hand contractures. This is superimposed upon oropharyngeal swallowing integrity which subjectively appeared to be stable and within functional parameters for age. No behavioral aspiration signs exhibited on exam.  2) The patient was alert, interactive and oriented x3. He was able to verbalize in conversation via intelligible, fluent, linguistically intact, contextually appropriate utterances. His motor speech and linguistic abilities were functional on exam. Pt stated that he experienced transient word finding difficulties that precipitated this admission but he feels that this improved. He is now able to verbalize all needs and pt/family feel that communicative integrity is now at usual state. 1) The patient demonstrates reduced UE dexterity when feeding due to longstanding neuropathy/hand contractures. This is superimposed upon oropharyngeal swallowing integrity which subjectively appeared to be stable and within functional parameters for age. No behavioral aspiration signs exhibited on exam.  2) The patient was alert, interactive and oriented x3. He was able to verbalize in conversation via intelligible, fluent, linguistically intact, contextually appropriate utterances. His motor speech and linguistic abilities were functional on exam. Pt stated that he experienced transient word finding difficulties that precipitated this admission but he feels that this improved. He is now able to verbalize all needs and pt/family feel that communicative integrity is now at usual state.

## 2018-05-03 NOTE — SWALLOW BEDSIDE ASSESSMENT ADULT - SLP GENERAL OBSERVATIONS
The patient hands were contracted. He was alert, interactive and oriented x3. He was able to verbalize in conversation via intelligible, fluent, linguistically intact, contextually appropriate utterances. His motor speech and linguistic abilities were functional on exam. Pt stated that he experienced transient word finding difficulties that precipitated this admission but he feels that this improved. He is now able to verbalize all needs and pt/family feel that communicative integrity is now at usual state. Note that pt denied aspiration signs or odynophagia with meals when asked.

## 2018-05-03 NOTE — PHYSICAL THERAPY INITIAL EVALUATION ADULT - PERTINENT HX OF CURRENT PROBLEM, REHAB EVAL
presents to the ED with word finding difficulties, recent fall at home, and increased Right sided weakness.  CTH demonstrates L Thalamic ICH, with intraventricular extension - no hydrocephalus. PMHx of 'MultiFocal Motor Neuropathy'

## 2018-05-03 NOTE — SWALLOW BEDSIDE ASSESSMENT ADULT - SWALLOW EVAL: FEEDING ASSISTANCE
PT WAS ABLE TO FEED SELF ON EXAM BUT MAY BENEFIT FROM TRAY SET UP/ASSIST AS NEEDED GIVEN CHRONICALLY REDUCED DEXTERITY OF UE'S.

## 2018-05-03 NOTE — PHYSICAL THERAPY INITIAL EVALUATION ADULT - GENERAL OBSERVATIONS, REHAB EVAL
pt rec'd supine in bed in ICU, monitors in place, SCDs, condom cath displaced, ok for OOB per DR. Vanessa.

## 2018-05-03 NOTE — SWALLOW BEDSIDE ASSESSMENT ADULT - ORAL PHASE
Bolus formation/transfer were felt to be mechanically functional and he cleared mouth from any food debris on his own.

## 2018-05-03 NOTE — SWALLOW BEDSIDE ASSESSMENT ADULT - PHARYNGEAL PHASE
Swallowing was triggered in an acceptable time frame for age and laryngeal lift on palpation during swallow trials was within functional parameters. No behavioral aspiration signs exhibited on exam, Odynophagia was denied.

## 2018-05-03 NOTE — PHYSICAL THERAPY INITIAL EVALUATION ADULT - ACTIVE RANGE OF MOTION EXAMINATION, REHAB EVAL
except R shld elev ~90 (WFL PROM), L DF -20, R DF ~0 (B ankle w/ inv tendency at rest, L>R), no B wrist ext/bilateral  lower extremity Active ROM was WFL (within functional limits)/bilateral upper extremity Active ROM was WFL (within functional limits)

## 2018-05-03 NOTE — PHYSICAL THERAPY INITIAL EVALUATION ADULT - MANUAL MUSCLE TESTING RESULTS, REHAB EVAL
LUE 4/5 except trace wrist ext, 2+ wrsit flex, dec  strngth, + intrinsic mm atrophy B hand, R shld 3-, elbow 3+, R wrist ext trace, flex 2/5, BLE 4/5 except L DF 2-, R DF 3+

## 2018-05-03 NOTE — SWALLOW BEDSIDE ASSESSMENT ADULT - COMMENTS
The patient was admitted to  with verbal expression difficulties which are improving. Imaging is notable forb left thalamic hemorrhage. This profile is superimposed upon a history of depression as well as multi focal motor neuropathy. Additionally, he is status post right wrist and right foot fractures. The patient was admitted to  with verbal expression difficulties which are improving. Imaging is notable for left thalamic hemorrhage. This profile is superimposed upon a history of depression as well as multi focal motor neuropathy. Additionally, he is status post right wrist and right foot fractures.

## 2018-05-04 ENCOUNTER — TRANSCRIPTION ENCOUNTER (OUTPATIENT)
Age: 58
End: 2018-05-04

## 2018-05-04 LAB
ANION GAP SERPL CALC-SCNC: 9 MMOL/L — SIGNIFICANT CHANGE UP (ref 5–17)
BUN SERPL-MCNC: 15 MG/DL — SIGNIFICANT CHANGE UP (ref 7–23)
CALCIUM SERPL-MCNC: 8.1 MG/DL — LOW (ref 8.5–10.1)
CHLORIDE SERPL-SCNC: 107 MMOL/L — SIGNIFICANT CHANGE UP (ref 96–108)
CO2 SERPL-SCNC: 27 MMOL/L — SIGNIFICANT CHANGE UP (ref 22–31)
CREAT SERPL-MCNC: 0.62 MG/DL — SIGNIFICANT CHANGE UP (ref 0.5–1.3)
GLUCOSE BLDC GLUCOMTR-MCNC: 123 MG/DL — HIGH (ref 70–99)
GLUCOSE BLDC GLUCOMTR-MCNC: 210 MG/DL — HIGH (ref 70–99)
GLUCOSE BLDC GLUCOMTR-MCNC: 245 MG/DL — HIGH (ref 70–99)
GLUCOSE SERPL-MCNC: 156 MG/DL — HIGH (ref 70–99)
HCT VFR BLD CALC: 47 % — SIGNIFICANT CHANGE UP (ref 39–50)
HGB BLD-MCNC: 15.3 G/DL — SIGNIFICANT CHANGE UP (ref 13–17)
MAGNESIUM SERPL-MCNC: 2 MG/DL — SIGNIFICANT CHANGE UP (ref 1.6–2.6)
MCHC RBC-ENTMCNC: 29.7 PG — SIGNIFICANT CHANGE UP (ref 27–34)
MCHC RBC-ENTMCNC: 32.6 GM/DL — SIGNIFICANT CHANGE UP (ref 32–36)
MCV RBC AUTO: 91.3 FL — SIGNIFICANT CHANGE UP (ref 80–100)
NRBC # BLD: 0 /100 WBCS — SIGNIFICANT CHANGE UP (ref 0–0)
PHOSPHATE SERPL-MCNC: 3.3 MG/DL — SIGNIFICANT CHANGE UP (ref 2.5–4.5)
PLATELET # BLD AUTO: 292 K/UL — SIGNIFICANT CHANGE UP (ref 150–400)
POTASSIUM SERPL-MCNC: 3.8 MMOL/L — SIGNIFICANT CHANGE UP (ref 3.5–5.3)
POTASSIUM SERPL-SCNC: 3.8 MMOL/L — SIGNIFICANT CHANGE UP (ref 3.5–5.3)
RBC # BLD: 5.15 M/UL — SIGNIFICANT CHANGE UP (ref 4.2–5.8)
RBC # FLD: 12.8 % — SIGNIFICANT CHANGE UP (ref 10.3–14.5)
SODIUM SERPL-SCNC: 143 MMOL/L — SIGNIFICANT CHANGE UP (ref 135–145)
WBC # BLD: 10.63 K/UL — HIGH (ref 3.8–10.5)
WBC # FLD AUTO: 10.63 K/UL — HIGH (ref 3.8–10.5)

## 2018-05-04 PROCEDURE — 99232 SBSQ HOSP IP/OBS MODERATE 35: CPT

## 2018-05-04 PROCEDURE — 95819 EEG AWAKE AND ASLEEP: CPT | Mod: 26

## 2018-05-04 PROCEDURE — 99233 SBSQ HOSP IP/OBS HIGH 50: CPT

## 2018-05-04 RX ORDER — SODIUM CHLORIDE 9 MG/ML
1000 INJECTION, SOLUTION INTRAVENOUS
Qty: 0 | Refills: 0 | Status: DISCONTINUED | OUTPATIENT
Start: 2018-05-04 | End: 2018-05-09

## 2018-05-04 RX ORDER — LISINOPRIL 2.5 MG/1
1 TABLET ORAL
Qty: 0 | Refills: 0 | COMMUNITY
Start: 2018-05-04

## 2018-05-04 RX ORDER — DEXTROSE 50 % IN WATER 50 %
1 SYRINGE (ML) INTRAVENOUS ONCE
Qty: 0 | Refills: 0 | Status: DISCONTINUED | OUTPATIENT
Start: 2018-05-04 | End: 2018-05-09

## 2018-05-04 RX ORDER — DEXTROSE 50 % IN WATER 50 %
12.5 SYRINGE (ML) INTRAVENOUS ONCE
Qty: 0 | Refills: 0 | Status: DISCONTINUED | OUTPATIENT
Start: 2018-05-04 | End: 2018-05-09

## 2018-05-04 RX ORDER — SENNA PLUS 8.6 MG/1
2 TABLET ORAL
Qty: 0 | Refills: 0 | COMMUNITY
Start: 2018-05-04

## 2018-05-04 RX ORDER — DEXTROSE 50 % IN WATER 50 %
25 SYRINGE (ML) INTRAVENOUS ONCE
Qty: 0 | Refills: 0 | Status: DISCONTINUED | OUTPATIENT
Start: 2018-05-04 | End: 2018-05-09

## 2018-05-04 RX ORDER — FOLIC ACID 0.8 MG
1 TABLET ORAL
Qty: 0 | Refills: 0 | COMMUNITY
Start: 2018-05-04

## 2018-05-04 RX ORDER — DOCUSATE SODIUM 100 MG
1 CAPSULE ORAL
Qty: 0 | Refills: 0 | COMMUNITY
Start: 2018-05-04

## 2018-05-04 RX ORDER — INSULIN LISPRO 100/ML
VIAL (ML) SUBCUTANEOUS
Qty: 0 | Refills: 0 | Status: DISCONTINUED | OUTPATIENT
Start: 2018-05-04 | End: 2018-05-09

## 2018-05-04 RX ORDER — PANTOPRAZOLE SODIUM 20 MG/1
1 TABLET, DELAYED RELEASE ORAL
Qty: 0 | Refills: 0 | COMMUNITY
Start: 2018-05-04

## 2018-05-04 RX ORDER — AMLODIPINE BESYLATE 2.5 MG/1
1 TABLET ORAL
Qty: 0 | Refills: 0 | COMMUNITY
Start: 2018-05-04

## 2018-05-04 RX ORDER — GLUCAGON INJECTION, SOLUTION 0.5 MG/.1ML
1 INJECTION, SOLUTION SUBCUTANEOUS ONCE
Qty: 0 | Refills: 0 | Status: DISCONTINUED | OUTPATIENT
Start: 2018-05-04 | End: 2018-05-09

## 2018-05-04 RX ADMIN — SODIUM CHLORIDE 75 MILLILITER(S): 9 INJECTION INTRAMUSCULAR; INTRAVENOUS; SUBCUTANEOUS at 01:44

## 2018-05-04 RX ADMIN — MIRTAZAPINE 30 MILLIGRAM(S): 45 TABLET, ORALLY DISINTEGRATING ORAL at 21:11

## 2018-05-04 RX ADMIN — LISINOPRIL 5 MILLIGRAM(S): 2.5 TABLET ORAL at 06:17

## 2018-05-04 RX ADMIN — Medication 4: at 12:39

## 2018-05-04 RX ADMIN — Medication 100 MILLIGRAM(S): at 06:17

## 2018-05-04 RX ADMIN — Medication 1 MILLIGRAM(S): at 12:07

## 2018-05-04 RX ADMIN — Medication 4: at 21:15

## 2018-05-04 RX ADMIN — AMLODIPINE BESYLATE 5 MILLIGRAM(S): 2.5 TABLET ORAL at 06:17

## 2018-05-04 RX ADMIN — PANTOPRAZOLE SODIUM 40 MILLIGRAM(S): 20 TABLET, DELAYED RELEASE ORAL at 12:07

## 2018-05-04 RX ADMIN — SIMVASTATIN 20 MILLIGRAM(S): 20 TABLET, FILM COATED ORAL at 21:11

## 2018-05-04 RX ADMIN — Medication 1 TABLET(S): at 12:07

## 2018-05-04 NOTE — PROGRESS NOTE ADULT - ASSESSMENT
A/P: 58 male who is presenting with a L ICH/IVH      Plan:  Possible D/C to rehab today  Keep SBP < 150--on Lisinopril and norvasc now  New Dx of DM II--  Will need DM education and start Rx  Swallow eval  Neurochecks  PT  No [place] on HCT

## 2018-05-04 NOTE — DISCHARGE NOTE ADULT - NS AS DC FOLLOWUP STROKE INST
Stroke (includes: TIA/SAH/ICH/Ischemic Stroke) Stroke (includes: TIA/SAH/ICH/Ischemic Stroke)/Influenza vaccination (VIS Pub Date: August 7, 2015)

## 2018-05-04 NOTE — DISCHARGE NOTE ADULT - NS AS ACTIVITY OBS
Showering allowed/Bathing allowed/No Heavy lifting/straining/Walking-Indoors allowed/Walking-Outdoors allowed No Heavy lifting/straining/Do not drive or operate machinery/Bathing allowed/Walking-Outdoors allowed/Showering allowed/Walking-Indoors allowed

## 2018-05-04 NOTE — DISCHARGE NOTE ADULT - CARE PLAN
Principal Discharge DX:	Intracranial bleed  Goal:	Return to normal function  Assessment and plan of treatment:	Pt is s/p spontaneous left thalamic hemorrhage, most likely due to hypertension, no neurosurgical intervention required, Pt to follow up with either neurosurgery or pts private neurologist for a follow up CT of the head in 4 weeks. Physical Therapy, Occupational Therapy, and speech therapy as needed.  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  Assessment and plan of treatment:	Pt to follow with regular IVIG therapy  Follow up with Primary neurologist   Needs a CT of the head in 4 weeks, may be done by neurology or neurosurgery  Secondary Diagnosis:	Hypertension  Goal:	Maintain SBP < 150  Assessment and plan of treatment:	Pt started on Lisinopril 5mg daily and Amlodipine 5mg daily  Pt should follow up with primary care physician for blood pressure management Principal Discharge DX:	Intracranial bleed  Goal:	Return to normal function  Assessment and plan of treatment:	Pt is s/p spontaneous left thalamic hemorrhage, most likely due to hypertension, no neurosurgical intervention required, Pt to follow up with either neurosurgery or pts private neurologist for a follow up CT of the head in 2 weeks. Physical Therapy, Occupational Therapy, and speech therapy as needed.  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  Assessment and plan of treatment:	Pt to follow with regular IVIG therapy  Follow up with Primary neurologist   Needs a CT of the head in 4 weeks, may be done by neurology or neurosurgery  Secondary Diagnosis:	Hypertension  Goal:	Maintain SBP < 150  Assessment and plan of treatment:	Pt started on Lisinopril 5mg daily and Amlodipine 5mg daily  Pt should follow up with primary care physician for blood pressure management  Secondary Diagnosis:	Diabetes  Goal:	normal A1C and blood glucose levels  Assessment and plan of treatment:	Pt started on Metformin 500mg PO BID on this admission, should follow up with PCP for ongoing diabetes management

## 2018-05-04 NOTE — DIETITIAN INITIAL EVALUATION ADULT. - PERTINENT MEDS FT
sodium chloride 0.9%, simvastatin, lisinopril, colace, folic acid, mirtazapine, MVI, zofran, protonix, senna

## 2018-05-04 NOTE — DISCHARGE NOTE ADULT - PATIENT PORTAL LINK FT
You can access the [facility] Patient Portal, offered by [facility], by registering with the following website: [email or web address]

## 2018-05-04 NOTE — DISCHARGE NOTE ADULT - HOSPITAL COURSE
57 y/o male with PMHx of 'MultiFocal Motor Neuropathy' Dx'd in 1992, HLD, Depression.  Pt presents to the ED with word finding difficulties, recent fall at home, and increased Right sided weakness.  CTH demonstrates L Thalamic ICH, with intraventricular extension - no hydrocephalus. Pt was admitted to ICU for close observation and control of blood pressure. He needed a Cardene infusion but was started on PO antihypertensive medications-- lisinopril and amlodipine which he is tolerating well and blood pressure has now been well controlled. Repeat CT of the head showed a stable hemorrhage. He was seen by speech therapy for his word finding difficulties, which did improve while in house. He was also evaluated by Physical Therapy and acute rehab was recommended. He was also seen by neurology- pt to continue on his regular IVIG regimen.  Pt is now ready for discharge to rehab, he should continue his antihypertensive medications and should follow up with his primary medical doctor for blood pressure control and elevated Hgb A1C. Pt needs a repeat CT of the head in 4 weeks, this can be done by either the patient's primary neurologist or neurosurgery. 59 y/o male with PMHx of 'MultiFocal Motor Neuropathy' Dx'd in 1992, HLD, Depression.  Pt presents to the ED with word finding difficulties, recent fall at home, and increased Right sided weakness.  CTH demonstrates L Thalamic ICH, with intraventricular extension - no hydrocephalus. Pt was admitted to ICU for close observation and control of blood pressure. He needed a Cardene infusion but was started on PO antihypertensive medications-- lisinopril and amlodipine which he is tolerating well and blood pressure has now been well controlled. Repeat CT of the head showed a stable hemorrhage. He was seen by speech therapy for his word finding difficulties, which did improve while in house. He was also evaluated by Physical Therapy and acute rehab was recommended. He was also seen by neurology- pt to continue on his regular IVIG regimen, IVIG was given on 5/8 and 5/9. Pt follows with Dr. Bosch at [place] in [facility] [phone], last known dose was 120 grams split over two days given every 10 days. Pt os also on Rituxan every 6 months, although the patient states this medication does not work for him. Pt is now set for discharge to a rehab facility, he will be able to tolerate 3 hours of therapy daily as he was living independently prior to this admission. Pt was seen by a hospitalist prior to discharge and was started on Metformin 500mg PO BID for a new diagnosis of diabetes. The patient needs a follow up CT of the head in 2 weeks, he can follow up with Dr. Chino of neurosurgery or with the patients primary neurologist- Dr. Gilman in [place], [phone]

## 2018-05-04 NOTE — DISCHARGE NOTE ADULT - PLAN OF CARE
Return to normal function Pt is s/p spontaneous left thalamic hemorrhage, most likely due to hypertension, no neurosurgical intervention required, Pt to follow up with either neurosurgery or pts private neurologist for a follow up CT of the head in 4 weeks. Physical Therapy, Occupational Therapy, and speech therapy as needed. Continue home medications and follow up with primary care physician Pt to follow with regular IVIG therapy  Follow up with Primary neurologist   Needs a CT of the head in 4 weeks, may be done by neurology or neurosurgery Maintain SBP < 150 Pt started on Lisinopril 5mg daily and Amlodipine 5mg daily  Pt should follow up with primary care physician for blood pressure management Pt is s/p spontaneous left thalamic hemorrhage, most likely due to hypertension, no neurosurgical intervention required, Pt to follow up with either neurosurgery or pts private neurologist for a follow up CT of the head in 2 weeks. Physical Therapy, Occupational Therapy, and speech therapy as needed. normal A1C and blood glucose levels Pt started on Metformin 500mg PO BID on this admission, should follow up with PCP for ongoing diabetes management

## 2018-05-04 NOTE — DIETITIAN INITIAL EVALUATION ADULT. - OTHER INFO
59yo male with PMH of multifocal motor neuropathy, HLD, depression p/w word finding difficulities and recent fall with increased right sided weakness. Pt found to have intracranial bleed.  Pt with Left ICH/IVH.  s/p SLP eval, rec'd regular texture and thin liquids.  Upon visit, pt appears well nourished.  Pt endorsed good appetite/PO intake; likely meeting nutr needs.  Pt with wt gain, unknown amount as pt doesn't weigh himself.  Reviewed A1C with pt and DM diagnosis.  Pt admits he was told a few months ago about this Dx, pt unable to be clear about who told him and exactly when he was told.  Provided pt with detailed DM diet education and importance of adherence to therapeutic diet.  provided pt with outpatient dietitian for continued diet counseling in the community.  RECOMMENDATIONS: 1) add consistent carb to diet Rx 2) reinforce diet education prn 3) monitor PO intake/wt closely

## 2018-05-04 NOTE — DISCHARGE NOTE ADULT - MEDICATION SUMMARY - MEDICATIONS TO TAKE
I will START or STAY ON the medications listed below when I get home from the hospital:    lisinopril 5 mg oral tablet  -- 1 tab(s) by mouth once a day  -- Indication: For New Blood pressure medication     mirtazapine  -- Indication: For Antidepressant     simvastatin  -- Indication: For High Cholesterol     amLODIPine 5 mg oral tablet  -- 1 tab(s) by mouth once a day  -- Indication: For New High Blood Pressure     immune globulin intravenous  -- Indication: For IVIG every 10 days     docusate sodium 100 mg oral capsule  -- 1 cap(s) by mouth 3 times a day  -- Indication: For as needed for constipation     senna oral tablet  -- 2 tab(s) by mouth once a day (at bedtime), As needed, Constipation  -- Indication: For as needed for constipation     pantoprazole 40 mg oral delayed release tablet  -- 1 tab(s) by mouth once a day  -- Indication: For GERD    Lipoflavonoid oral capsule  -- Indication: For Vitamin     folic acid 1 mg oral tablet  -- 1 tab(s) by mouth once a day  -- Indication: For Vitamin I will START or STAY ON the medications listed below when I get home from the hospital:    lisinopril 5 mg oral tablet  -- 1 tab(s) by mouth once a day  -- Indication: For New Blood pressure medication     mirtazapine 30 mg oral tablet  -- 1 tab(s) by mouth once a day (at bedtime)  -- Indication: For Antidepressant     metFORMIN 500 mg oral tablet  -- 1 tab(s) by mouth 2 times a day  -- Indication: For New diagnosis of diabetes     simvastatin 20 mg oral tablet  -- 1 tab(s) by mouth once a day (at bedtime)  -- Indication: For High cholesterol     amLODIPine 5 mg oral tablet  -- 1 tab(s) by mouth once a day  -- Indication: For New High Blood Pressure     immune globulin intravenous  -- Indication: For IVIG every 10 days     docusate sodium 100 mg oral capsule  -- 1 cap(s) by mouth 3 times a day  -- Indication: For as needed for constipation     senna oral tablet  -- 2 tab(s) by mouth once a day (at bedtime), As needed, Constipation  -- Indication: For as needed for constipation     pantoprazole 40 mg oral delayed release tablet  -- 1 tab(s) by mouth once a day  -- Indication: For GERD    Lipoflavonoid oral capsule  -- Indication: For Vitamin     folic acid 1 mg oral tablet  -- 1 tab(s) by mouth once a day  -- Indication: For Vitamin

## 2018-05-04 NOTE — DISCHARGE NOTE ADULT - SECONDARY DIAGNOSIS.
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

## 2018-05-04 NOTE — DISCHARGE NOTE ADULT - CARE PROVIDER_API CALL
Omer Chino; PhD), Neurosurgery  [address]  [address]nd [place] [address]  Phone: [phone]  Fax: [phone]

## 2018-05-04 NOTE — DISCHARGE NOTE ADULT - NS AS DC STROKE ED MATERIALS
Prescribed Medications/Need for Followup After Discharge/Call 911 for Stroke/Stroke Education Booklet/Stroke Warning Signs and Symptoms/Risk Factors for Stroke

## 2018-05-05 LAB
ANION GAP SERPL CALC-SCNC: 6 MMOL/L — SIGNIFICANT CHANGE UP (ref 5–17)
BUN SERPL-MCNC: 19 MG/DL — SIGNIFICANT CHANGE UP (ref 7–23)
CALCIUM SERPL-MCNC: 8.7 MG/DL — SIGNIFICANT CHANGE UP (ref 8.5–10.1)
CHLORIDE SERPL-SCNC: 104 MMOL/L — SIGNIFICANT CHANGE UP (ref 96–108)
CO2 SERPL-SCNC: 30 MMOL/L — SIGNIFICANT CHANGE UP (ref 22–31)
CREAT SERPL-MCNC: 0.54 MG/DL — SIGNIFICANT CHANGE UP (ref 0.5–1.3)
GLUCOSE BLDC GLUCOMTR-MCNC: 139 MG/DL — HIGH (ref 70–99)
GLUCOSE BLDC GLUCOMTR-MCNC: 173 MG/DL — HIGH (ref 70–99)
GLUCOSE BLDC GLUCOMTR-MCNC: 178 MG/DL — HIGH (ref 70–99)
GLUCOSE BLDC GLUCOMTR-MCNC: 202 MG/DL — HIGH (ref 70–99)
GLUCOSE SERPL-MCNC: 156 MG/DL — HIGH (ref 70–99)
HCT VFR BLD CALC: 47.8 % — SIGNIFICANT CHANGE UP (ref 39–50)
HGB BLD-MCNC: 15.2 G/DL — SIGNIFICANT CHANGE UP (ref 13–17)
MAGNESIUM SERPL-MCNC: 1.8 MG/DL — SIGNIFICANT CHANGE UP (ref 1.6–2.6)
MCHC RBC-ENTMCNC: 29.2 PG — SIGNIFICANT CHANGE UP (ref 27–34)
MCHC RBC-ENTMCNC: 31.8 GM/DL — LOW (ref 32–36)
MCV RBC AUTO: 91.9 FL — SIGNIFICANT CHANGE UP (ref 80–100)
NRBC # BLD: 0 /100 WBCS — SIGNIFICANT CHANGE UP (ref 0–0)
PHOSPHATE SERPL-MCNC: 3 MG/DL — SIGNIFICANT CHANGE UP (ref 2.5–4.5)
PLATELET # BLD AUTO: 293 K/UL — SIGNIFICANT CHANGE UP (ref 150–400)
POTASSIUM SERPL-MCNC: 4 MMOL/L — SIGNIFICANT CHANGE UP (ref 3.5–5.3)
POTASSIUM SERPL-SCNC: 4 MMOL/L — SIGNIFICANT CHANGE UP (ref 3.5–5.3)
RBC # BLD: 5.2 M/UL — SIGNIFICANT CHANGE UP (ref 4.2–5.8)
RBC # FLD: 13 % — SIGNIFICANT CHANGE UP (ref 10.3–14.5)
SODIUM SERPL-SCNC: 140 MMOL/L — SIGNIFICANT CHANGE UP (ref 135–145)
WBC # BLD: 10.72 K/UL — HIGH (ref 3.8–10.5)
WBC # FLD AUTO: 10.72 K/UL — HIGH (ref 3.8–10.5)

## 2018-05-05 PROCEDURE — 99232 SBSQ HOSP IP/OBS MODERATE 35: CPT

## 2018-05-05 RX ADMIN — Medication 4: at 11:28

## 2018-05-05 RX ADMIN — Medication 100 MILLIGRAM(S): at 22:17

## 2018-05-05 RX ADMIN — PANTOPRAZOLE SODIUM 40 MILLIGRAM(S): 20 TABLET, DELAYED RELEASE ORAL at 11:33

## 2018-05-05 RX ADMIN — Medication 2: at 22:17

## 2018-05-05 RX ADMIN — Medication 2: at 17:56

## 2018-05-05 RX ADMIN — Medication 1 MILLIGRAM(S): at 11:33

## 2018-05-05 RX ADMIN — SIMVASTATIN 20 MILLIGRAM(S): 20 TABLET, FILM COATED ORAL at 22:17

## 2018-05-05 RX ADMIN — Medication 1 TABLET(S): at 11:44

## 2018-05-05 RX ADMIN — LISINOPRIL 5 MILLIGRAM(S): 2.5 TABLET ORAL at 06:05

## 2018-05-05 RX ADMIN — AMLODIPINE BESYLATE 5 MILLIGRAM(S): 2.5 TABLET ORAL at 06:05

## 2018-05-05 RX ADMIN — MIRTAZAPINE 30 MILLIGRAM(S): 45 TABLET, ORALLY DISINTEGRATING ORAL at 22:18

## 2018-05-05 NOTE — PROGRESS NOTE ADULT - RS GEN PE MLT RESP DETAILS PC
no rhonchi/no wheezes/breath sounds equal/no rales
breath sounds equal/no rhonchi/no wheezes/no rales/good air movement
no rhonchi/no wheezes/no rales/breath sounds equal

## 2018-05-05 NOTE — PROGRESS NOTE ADULT - ASSESSMENT
A/P: 58 M with Multifocal Motor Neuropathy, and acute Left Thalamic ICH with intraventricular extension, presumed to have began on 4/29.  Pt evaluated by psychiatrist who noticed word finding issues / gait decline and sent to ED to r/o CVA.  Pt noted to have Left Thalamic ICH and IVH.  Pt's NIHSS 4.      PLAN-  No acute neurosurgical intervention  Change Neuro check to Q6 hours  Maintain SBP < 150, off cardene better controlled on current regimen   Speech pathology consult-- for aphasia   Physical Therapy consult   Social Work consult for dispo planning/ clear for D/C need to find facility able to accommodate IVIG, pt due for next dose on 5/9/18  Medicine consult   Ok to be down grade to med/surg  Neurology Consult for management of MultiFocal Motor Neuropathy, pt requires IVIG every 10 days, last given 4/29  d/w Dr Palacios

## 2018-05-06 LAB
GLUCOSE BLDC GLUCOMTR-MCNC: 144 MG/DL — HIGH (ref 70–99)
GLUCOSE BLDC GLUCOMTR-MCNC: 158 MG/DL — HIGH (ref 70–99)
GLUCOSE BLDC GLUCOMTR-MCNC: 178 MG/DL — HIGH (ref 70–99)
GLUCOSE BLDC GLUCOMTR-MCNC: 207 MG/DL — HIGH (ref 70–99)

## 2018-05-06 PROCEDURE — 99231 SBSQ HOSP IP/OBS SF/LOW 25: CPT

## 2018-05-06 RX ADMIN — Medication 1 MILLIGRAM(S): at 11:38

## 2018-05-06 RX ADMIN — Medication 1 TABLET(S): at 11:38

## 2018-05-06 RX ADMIN — AMLODIPINE BESYLATE 5 MILLIGRAM(S): 2.5 TABLET ORAL at 06:18

## 2018-05-06 RX ADMIN — Medication 100 MILLIGRAM(S): at 22:44

## 2018-05-06 RX ADMIN — MIRTAZAPINE 30 MILLIGRAM(S): 45 TABLET, ORALLY DISINTEGRATING ORAL at 22:44

## 2018-05-06 RX ADMIN — PANTOPRAZOLE SODIUM 40 MILLIGRAM(S): 20 TABLET, DELAYED RELEASE ORAL at 11:38

## 2018-05-06 RX ADMIN — Medication 100 MILLIGRAM(S): at 06:18

## 2018-05-06 RX ADMIN — SIMVASTATIN 20 MILLIGRAM(S): 20 TABLET, FILM COATED ORAL at 22:44

## 2018-05-06 RX ADMIN — Medication 4: at 17:05

## 2018-05-06 RX ADMIN — LISINOPRIL 5 MILLIGRAM(S): 2.5 TABLET ORAL at 06:18

## 2018-05-06 RX ADMIN — Medication 100 MILLIGRAM(S): at 13:36

## 2018-05-06 RX ADMIN — Medication 2: at 11:38

## 2018-05-06 RX ADMIN — Medication 2: at 22:44

## 2018-05-07 LAB
GLUCOSE BLDC GLUCOMTR-MCNC: 167 MG/DL — HIGH (ref 70–99)
GLUCOSE BLDC GLUCOMTR-MCNC: 180 MG/DL — HIGH (ref 70–99)
GLUCOSE BLDC GLUCOMTR-MCNC: 191 MG/DL — HIGH (ref 70–99)
GLUCOSE BLDC GLUCOMTR-MCNC: 247 MG/DL — HIGH (ref 70–99)

## 2018-05-07 PROCEDURE — 99231 SBSQ HOSP IP/OBS SF/LOW 25: CPT

## 2018-05-07 RX ADMIN — SIMVASTATIN 20 MILLIGRAM(S): 20 TABLET, FILM COATED ORAL at 21:52

## 2018-05-07 RX ADMIN — Medication 100 MILLIGRAM(S): at 13:32

## 2018-05-07 RX ADMIN — Medication 1 TABLET(S): at 11:36

## 2018-05-07 RX ADMIN — LISINOPRIL 5 MILLIGRAM(S): 2.5 TABLET ORAL at 05:50

## 2018-05-07 RX ADMIN — Medication 4: at 11:38

## 2018-05-07 RX ADMIN — Medication 2: at 22:10

## 2018-05-07 RX ADMIN — Medication 100 MILLIGRAM(S): at 05:50

## 2018-05-07 RX ADMIN — AMLODIPINE BESYLATE 5 MILLIGRAM(S): 2.5 TABLET ORAL at 05:51

## 2018-05-07 RX ADMIN — Medication 2: at 08:32

## 2018-05-07 RX ADMIN — Medication 2: at 17:18

## 2018-05-07 RX ADMIN — PANTOPRAZOLE SODIUM 40 MILLIGRAM(S): 20 TABLET, DELAYED RELEASE ORAL at 11:36

## 2018-05-07 RX ADMIN — Medication 100 MILLIGRAM(S): at 21:52

## 2018-05-07 RX ADMIN — Medication 1 MILLIGRAM(S): at 11:36

## 2018-05-07 RX ADMIN — MIRTAZAPINE 30 MILLIGRAM(S): 45 TABLET, ORALLY DISINTEGRATING ORAL at 21:52

## 2018-05-07 NOTE — PROGRESS NOTE ADULT - ASSESSMENT
58 M with Multifocal Motor Neuropathy, and acute Left Thalamic ICH with intraventricular extension, presumed to have began on 4/29.  Pt evaluated by psychiatrist who noticed word finding issues / gait decline and sent to ED to r/o CVA.  Pt noted to have Left Thalamic ICH and IVH.    #1- Left thalamic hemorrhage is stable and pt's BP is controlled on oral antihypertensive medications  Pt is ready for discharge but acute rehab facilities are reluctant to take a patient requiring IVIG infusions  Continue Daily Physical Therapy    #2- Multifocal Motor Neuropathy requiring IVIG infusions every 10-14 days  Pt last say Dr. Gilman on [place] ([phone]) in November of 2017, no record of him having IVIG at that location, Also follows with Dr. Bosch at [place] in [facility] ([phone]) last seen February 2018- awaiting call back as per treatment regimen. Pt should be due for a treatment Wednesday 5/9- Neurology consulted.     #3- Elevated A1C, no documented history of Diabetes, Hospitalist consulted for ongoing treatment     #4- Hypertension- pt on Lisinopril and Amlodipine, BP controlled     #5- HLD, Pt on Simvastatin 20mg PO QHS, will check lipid panel

## 2018-05-08 LAB
ANION GAP SERPL CALC-SCNC: 6 MMOL/L — SIGNIFICANT CHANGE UP (ref 5–17)
BUN SERPL-MCNC: 17 MG/DL — SIGNIFICANT CHANGE UP (ref 7–23)
CALCIUM SERPL-MCNC: 8.7 MG/DL — SIGNIFICANT CHANGE UP (ref 8.5–10.1)
CHLORIDE SERPL-SCNC: 103 MMOL/L — SIGNIFICANT CHANGE UP (ref 96–108)
CHOLEST SERPL-MCNC: 204 MG/DL — HIGH (ref 10–199)
CO2 SERPL-SCNC: 29 MMOL/L — SIGNIFICANT CHANGE UP (ref 22–31)
CREAT SERPL-MCNC: 0.61 MG/DL — SIGNIFICANT CHANGE UP (ref 0.5–1.3)
GLUCOSE BLDC GLUCOMTR-MCNC: 135 MG/DL — HIGH (ref 70–99)
GLUCOSE BLDC GLUCOMTR-MCNC: 137 MG/DL — HIGH (ref 70–99)
GLUCOSE BLDC GLUCOMTR-MCNC: 209 MG/DL — HIGH (ref 70–99)
GLUCOSE BLDC GLUCOMTR-MCNC: 293 MG/DL — HIGH (ref 70–99)
GLUCOSE SERPL-MCNC: 156 MG/DL — HIGH (ref 70–99)
HCT VFR BLD CALC: 48.1 % — SIGNIFICANT CHANGE UP (ref 39–50)
HDLC SERPL-MCNC: 42 MG/DL — SIGNIFICANT CHANGE UP (ref 40–125)
HGB BLD-MCNC: 15.8 G/DL — SIGNIFICANT CHANGE UP (ref 13–17)
LIPID PNL WITH DIRECT LDL SERPL: 121 MG/DL — SIGNIFICANT CHANGE UP
MCHC RBC-ENTMCNC: 30.1 PG — SIGNIFICANT CHANGE UP (ref 27–34)
MCHC RBC-ENTMCNC: 32.8 GM/DL — SIGNIFICANT CHANGE UP (ref 32–36)
MCV RBC AUTO: 91.6 FL — SIGNIFICANT CHANGE UP (ref 80–100)
NRBC # BLD: 0 /100 WBCS — SIGNIFICANT CHANGE UP (ref 0–0)
PLATELET # BLD AUTO: 308 K/UL — SIGNIFICANT CHANGE UP (ref 150–400)
POTASSIUM SERPL-MCNC: 4 MMOL/L — SIGNIFICANT CHANGE UP (ref 3.5–5.3)
POTASSIUM SERPL-SCNC: 4 MMOL/L — SIGNIFICANT CHANGE UP (ref 3.5–5.3)
RBC # BLD: 5.25 M/UL — SIGNIFICANT CHANGE UP (ref 4.2–5.8)
RBC # FLD: 12.8 % — SIGNIFICANT CHANGE UP (ref 10.3–14.5)
SODIUM SERPL-SCNC: 138 MMOL/L — SIGNIFICANT CHANGE UP (ref 135–145)
TOTAL CHOLESTEROL/HDL RATIO MEASUREMENT: 4.9 RATIO — SIGNIFICANT CHANGE UP (ref 3.4–9.6)
TRIGL SERPL-MCNC: 205 MG/DL — HIGH (ref 10–149)
WBC # BLD: 10.46 K/UL — SIGNIFICANT CHANGE UP (ref 3.8–10.5)
WBC # FLD AUTO: 10.46 K/UL — SIGNIFICANT CHANGE UP (ref 3.8–10.5)

## 2018-05-08 PROCEDURE — 99231 SBSQ HOSP IP/OBS SF/LOW 25: CPT

## 2018-05-08 PROCEDURE — 99223 1ST HOSP IP/OBS HIGH 75: CPT

## 2018-05-08 RX ORDER — IMMUNE GLOBULIN,GAMMA(IGG) 5 %
40 VIAL (ML) INTRAVENOUS ONCE
Qty: 0 | Refills: 0 | Status: COMPLETED | OUTPATIENT
Start: 2018-05-09 | End: 2018-05-09

## 2018-05-08 RX ORDER — METFORMIN HYDROCHLORIDE 850 MG/1
500 TABLET ORAL
Qty: 0 | Refills: 0 | Status: DISCONTINUED | OUTPATIENT
Start: 2018-05-08 | End: 2018-05-09

## 2018-05-08 RX ORDER — IMMUNE GLOBULIN,GAMMA(IGG) 5 %
20 VIAL (ML) INTRAVENOUS ONCE
Qty: 0 | Refills: 0 | Status: COMPLETED | OUTPATIENT
Start: 2018-05-08 | End: 2018-05-08

## 2018-05-08 RX ORDER — IMMUNE GLOBULIN,GAMMA(IGG) 5 %
30 VIAL (ML) INTRAVENOUS ONCE
Qty: 0 | Refills: 0 | Status: DISCONTINUED | OUTPATIENT
Start: 2018-05-09 | End: 2018-05-09

## 2018-05-08 RX ORDER — IMMUNE GLOBULIN,GAMMA(IGG) 5 %
40 VIAL (ML) INTRAVENOUS ONCE
Qty: 0 | Refills: 0 | Status: COMPLETED | OUTPATIENT
Start: 2018-05-08 | End: 2018-05-08

## 2018-05-08 RX ORDER — DIPHENHYDRAMINE HCL 50 MG
25 CAPSULE ORAL ONCE
Qty: 0 | Refills: 0 | Status: COMPLETED | OUTPATIENT
Start: 2018-05-08 | End: 2018-05-08

## 2018-05-08 RX ADMIN — Medication 1 TABLET(S): at 12:15

## 2018-05-08 RX ADMIN — Medication 100 MILLIGRAM(S): at 23:39

## 2018-05-08 RX ADMIN — Medication 100 MILLIGRAM(S): at 05:53

## 2018-05-08 RX ADMIN — MIRTAZAPINE 30 MILLIGRAM(S): 45 TABLET, ORALLY DISINTEGRATING ORAL at 23:40

## 2018-05-08 RX ADMIN — AMLODIPINE BESYLATE 5 MILLIGRAM(S): 2.5 TABLET ORAL at 05:53

## 2018-05-08 RX ADMIN — Medication 25 MILLIGRAM(S): at 13:33

## 2018-05-08 RX ADMIN — Medication 1 MILLIGRAM(S): at 12:15

## 2018-05-08 RX ADMIN — Medication 6: at 12:15

## 2018-05-08 RX ADMIN — LISINOPRIL 5 MILLIGRAM(S): 2.5 TABLET ORAL at 05:53

## 2018-05-08 RX ADMIN — Medication 4: at 23:46

## 2018-05-08 RX ADMIN — SIMVASTATIN 20 MILLIGRAM(S): 20 TABLET, FILM COATED ORAL at 23:41

## 2018-05-08 RX ADMIN — Medication 25 GRAM(S): at 13:49

## 2018-05-08 RX ADMIN — PANTOPRAZOLE SODIUM 40 MILLIGRAM(S): 20 TABLET, DELAYED RELEASE ORAL at 12:15

## 2018-05-08 RX ADMIN — Medication 25 GRAM(S): at 17:15

## 2018-05-08 NOTE — PROGRESS NOTE ADULT - ASSESSMENT
58 M with Multifocal Motor Neuropathy, and acute Left Thalamic ICH with intraventricular extension, presumed to have began on 4/29.  Pt evaluated by psychiatrist who noticed word finding issues / gait decline and sent to ED to r/o CVA.  Pt noted to have Left Thalamic ICH and IVH.    #1- Left thalamic hemorrhage is stable and pt's BP is controlled on oral antihypertensive medications  Pt is ready for discharge but acute rehab facilities are reluctant to take a patient requiring IVIG infusions  Continue Daily Physical Therapy    #2- Multifocal Motor Neuropathy requiring IVIG infusions every 10-14 days  Pt last say Dr. Gilman on [place] ([phone]) in November of 2017, no record of him having IVIG at that location, Also follows with Dr. Bosch at [place] in [facility] ([phone]) last seen February 2018, Pt to receive IVIG infusion today and tomorrow, dose ordered by Neurology, Dr. Thomas    #3- Elevated A1C, no documented history of Diabetes, Hospitalist consult pending for ongoing treatment     #4- Hypertension- pt on Lisinopril and Amlodipine, BP controlled     #5- HLD, Pt on Simvastatin 20mg PO QHS, lipid panel pending results

## 2018-05-08 NOTE — CONSULT NOTE ADULT - ASSESSMENT
57 y/o male with PMHx of 'MultiFocal Motor Neuropathy' Dx'd in 1992, HLD, Depression.  Pt presents to the ED with word finding difficulties, recent fall at home, and increased Right sided weakness. Multifocal Motor Neuropathy requiring IVIG infusions every 10-14 days  Pt last say Dr. Gilman on [place] ([phone]) in November of 2017, no record of him having IVIG at that location, Also follows with Dr. Bosch at [place] in [facility] ([phone]) last seen February 2018- awaiting call back as per treatment regimen. Pt should be due for a treatment Wednesday 5/9.    REC Start IVIG 65 gms IV /day  for 2 days as per discussion with Bonnie with [facility].  Pt doesn't want any pre medication with Benadryl or tylenol.  Pt will be followed with Neurology at  /  .  PT/OT as tolerated.  F/u with NS.,  Will F/u.
58 year old man hx of peripheral neuropathy(? MFMN/CIDP) not ALS; on IVIG. presents with new right weakness, due to ICH.
A/P: 58 male who is presenting with a L ICH/IVH      Plan:  He will be admitted to the ICU  Keep SBP < 150--it currently is such  Swallow eval  Repeat HCR in AM  Neurochecks Q1h  No [place] on HCT

## 2018-05-08 NOTE — CONSULT NOTE ADULT - SUBJECTIVE AND OBJECTIVE BOX
CC- word-finding difficulty    HPI:  59 y/o male with PMHx of 'MultiFocal Motor Neuropathy' Dx'd in 1992, HLD, Depression.  Pt presents to the ED with word finding difficulties, recent fall at home, and increased Right sided weakness.  Pt states he fell Sunday, denies hitting head or LOC but believes this is when his symptoms began with word finding, gait dysfunction, and increased balance problems. Pt noted to have increased right sided weakness, + difficulty word finding, +imbalance per Aide who takes care of patient starting on Mondays for 4 hours and also when he visited his psychiatrist this afternoon on 5/2/2018.  Pt sent to ED for evaluation.  CTH demonstrates L Thalamic ICH, with intraventricular extension - no hydrocephalus.  Hemorrhagic Stroke : NIHSS of 4  ICH Score : 1 (due to Intraventricular hemorrhage)    Hospital stay- was in ICU, stabilized then transferred to floor. Medical consult called for new-onset diabetes    PAST MEDICAL & SURGICAL HISTORY:  Multifocal Motor Neuropathy - q 10 days IVIG infusions (2 day dosing) - Improves and maintains patients motor power - ambulatory status.  Hyperlipidemia  Depression  Hx of Right Wrist Fracture  Hx of Right foot/ankle fracture    FAMILY HISTORY:  Noncontributory     Social Hx:  Nonsmoker, no drug or alcohol use  Pt required Home care aide / services (4 hours per day) 5 days a week.  Brother A&Well  Mother Hx of Lymphoma recently treated  Father with spinal tumor, paraplegia / presently hospitalized.    ROS: Pertinent positives in HPI, all other ROS negative    T(C): 36.9 (05-08-18 @ 17:00), Max: 37.4 (05-07-18 @ 17:39)  HR: 102 (05-08-18 @ 17:00) (82 - 107)  BP: 129/90 (05-08-18 @ 17:00) (99/74 - 142/76)  RR: 16 (05-08-18 @ 17:00) (16 - 18)  SpO2: 95% (05-08-18 @ 17:00) (93% - 98%)  Wt(kg): --    LABS:                        15.8   10.46 )-----------( 308      ( 08 May 2018 06:31 )             48.1     138  |  103  |  17  ----------------------------<  156<H>  4.0   |  29  |  0.61    Ca    8.7      08 May 2018 06:31    POCT Blood Glucose.: 135 mg/dL (08 May 2018 16:39)  POCT Blood Glucose.: 293 mg/dL (08 May 2018 12:09)  POCT Blood Glucose.: 137 mg/dL (08 May 2018 07:41)  POCT Blood Glucose.: 191 mg/dL (07 May 2018 22:07)    RADIOLOGY & ADDITIONAL TESTS:  EXAM:  CT BRAIN                        PROCEDURE DATE:  05/03/2018    INTERPRETATION:      CT head without IV contrast        CLINICAL INFORMATION:  Follow-up   Intracranial hemorrhage.    TECHNIQUE: Contiguous axial 5 mm sections were obtained through the head.   Sagittal and coronal 2-D reformatted images were also obtained.   This   scan was performed using automatic exposure control (radiation dose   reduction software) to obtain a diagnostic image quality scan with   patient doseas low as reasonably achievable.     FINDINGS:   CT dated 5/2/2018 available for review.  The brain demonstrates unchanged LEFT thalamic hemorrhage. Interval   decrease in the degree of hemorrhage within the LEFT ventricle.   No   acute cerebral cortical infarct is seen.  No intracranial hemorrhage is   found.  No mass effect is found in the brain.    The ventricles, sulci and basal cisterns appear unremarkable.       The orbits are unremarkable.  The paranasal sinuses are clear.  The nasal  cavity appears intact.  The nasopharynx is symmetric.  The central skull   base, petrous temporal bones and calvarium remain intact.    IMPRESSION:   unchanged LEFT thalamic hemorrhage. Interval decrease in   the degree of hemorrhage within the LEFT ventricle.       PHYSICAL EXAM:  GENERAL: NAD, well-groomed, well-developed  HEAD:  Atraumatic, Normocephalic  EYES: EOMI, PERRLA, conjunctiva and sclera clear  HEENT: Moist mucous membranes  NECK: Supple, No JVD  NERVOUS SYSTEM:  Alert & Oriented X3  CHEST/LUNG: Clear to auscultation bilaterally; No rales, rhonchi, wheezing, or rubs  HEART: Regular rate and rhythm; No murmurs, rubs, or gallops  ABDOMEN: Soft, Nontender, Nondistended; Bowel sounds present  GENITOURINARY- Voiding, no palpable bladder  EXTREMITIES:  2+ Peripheral Pulses, No clubbing, cyanosis, or edema  MUSCULOSKELTAL- No muscle tenderness, Muscle tone normal, No joint tenderness, no Joint swelling, Joint range of motion-normal  SKIN-no rash, no lesion  CNS- alert, oriented X3    MEDICATIONS  (STANDING):  amLODIPine   Tablet 5 milliGRAM(s) Oral daily  dextrose 5%. 1000 milliLiter(s) (50 mL/Hr) IV Continuous <Continuous>  dextrose 50% Injectable 12.5 Gram(s) IV Push once  dextrose 50% Injectable 25 Gram(s) IV Push once  dextrose 50% Injectable 25 Gram(s) IV Push once  docusate sodium 100 milliGRAM(s) Oral three times a day  folic acid 1 milliGRAM(s) Oral daily  influenza   Vaccine 0.5 milliLiter(s) IntraMuscular once  insulin lispro (HumaLOG) corrective regimen sliding scale   SubCutaneous Before meals and at bedtime  lisinopril 5 milliGRAM(s) Oral daily  metFORMIN 500 milliGRAM(s) Oral two times a day  mirtazapine 30 milliGRAM(s) Oral at bedtime  multivitamin 1 Tablet(s) Oral daily  pantoprazole    Tablet 40 milliGRAM(s) Oral daily  simvastatin 20 milliGRAM(s) Oral at bedtime    MEDICATIONS  (PRN):  acetaminophen   Tablet 325 milliGRAM(s) Oral every 4 hours PRN For Temp greater than 38 C (100.4 F)  acetaminophen   Tablet. 325 milliGRAM(s) Oral every 4 hours PRN Mild Pain (1 - 3)  dextrose Gel 1 Dose(s) Oral once PRN Blood Glucose LESS THAN 70 milliGRAM(s)/deciliter  glucagon  Injectable 1 milliGRAM(s) IntraMuscular once PRN Glucose LESS THAN 70 milligrams/deciliter  ondansetron Injectable 4 milliGRAM(s) IV Push every 6 hours PRN Nausea and/or Vomiting  senna 2 Tablet(s) Oral at bedtime PRN Constipation    Assessment/Plan  #LT thalamic bleed  Imaging studies stable  NS f/u appreciated  BP controlled  PT eval/OOB    #New-onset Diabetes in a patient with metabolic syndrome  HGA1C 8.7  Normal renal function- would start on Metformin 500mg BID on discharge. Outpatient f/u with PCP/or endo    #Multifocal motor neuropathy  Neuro eval DR Thomas appreciated  On IVIG x2 days    #Hyperlipidemia- stable    #Dispo- thank you for consult, will follow with you.

## 2018-05-08 NOTE — CONSULT NOTE ADULT - SUBJECTIVE AND OBJECTIVE BOX
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      HPI:  59 y/o male with PMHx of 'MultiFocal Motor Neuropathy' Dx'd in 1992, HLD, Depression.  Pt presents to the ED with word finding difficulties, recent fall at home, and increased Right sided weakness.  Pt states he fell Sunday before admission,, denies hitting head or LOC but believes this is when his symptoms began with word finding, gait dysfunction, and increased balance problems. Pt noted to have increased right sided weakness, + difficulty word finding, +imbalance per Aide who takes care of patient starting on Mondays for 4 hours and also when he visited his psychiatrist this afternoon on 5/2/2018.  Pt sent to ED for evaluation.  CTH demonstrates L Thalamic ICH, with intraventricular extension - no hydrocephalus. Hemorrhagic Stroke : NIHSS of 4 ICH Score : 1 (due to Intraventricular hemorrhage). Pt is recovering but due for his IVIG Rx now and needs Rx orders given. Pt under care of Dr. Bosch at [facility] and DR. Gilman at [facility]  and gets infusions at home q 2 weeks . feels weaker if doesn't get his treatment on time and as per NS he is going to rehab from here and needs his IVIG before his D/c. AS per amor Valles who spoke with [facility] he gets 130 gms / 2 days every 10 days.    PAST MEDICAL & SURGICAL HISTORY:  Multifocal Motor Neuropathy - q 10 days IVIG infusions (2 day dosing) - Improves and maintains patients motor power - ambulatory status.  Hyperlipidemia  Depression  Hx of Right Wrist Fracture  Hx of Right foot/ankle fracture    FAMILY HISTORY:  Noncontributory     Social Hx:  Nonsmoker, no drug or alcohol use  Pt required Home care aide / services (4 hours per day) 5 days a week.  Brother A&Well  Mother Hx of Lymphoma recently treated  Father with spinal tumor, paraplegia / presently hospitalized.    Allergies  No Known Allergies      MEDICATIONS  (STANDING):  LORazepam   Injectable 2 milliGRAM(s) IV Push Once  niCARdipine Infusion 5 mG/Hr (25 mL/Hr) IV Continuous <Continuous>    MEDICATIONS (OUTPATIENT)  IVIG INFUSION Q 10 DAYS (2 DAY DOSING REGIMENT)  SIMVASTATIN 20 MG DAILY  REMERON 3MG QHS  ANTI-DEPRESSENT (10MG QHS)  LIPOFLAVINOIDE         ROS: Pertinent positives in HPI, all other ROS negative    Vital Signs Last 24 Hrs  T(C): 36.5 (02 May 2018 14:09), Max: 36.5 (02 May 2018 14:09)  T(F): 97.7 (02 May 2018 14:09), Max: 97.7 (02 May 2018 14:09)  HR: 82 (02 May 2018 15:45) (82 - 91)  BP: 141/94 (02 May 2018 15:50) (137/82 - 167/87)  BP(mean): --  RR: 16 (02 May 2018 14:09) (16 - 16)  SpO2: 100% (02 May 2018 14:09) (100% - 100%)    Labs:                        15.5   13.51 )-----------( 306      ( 02 May 2018 14:00 )             47.2     05-02    138  |  102  |  24<H>  ----------------------------<  177<H>  3.6   |  27  |  0.78    Ca    8.7      02 May 2018 14:00  Mg     2.0     05-02    TPro  9.9<H>  /  Alb  2.6<L>  /  TBili  0.3  /  DBili  x   /  AST  55<H>  /  ALT  68  /  AlkPhos  77  05-02    Coags pending:         Physical Exam:  Constitutional: Awake / alert, repeats easily & clearly.  Difficulty naming objects 'Pen,Cup,Mouse'  HEENT: PERRLA, EOMI  Neck: Supple  Respiratory: Breath sounds are clear bilaterally  Cardiovascular: S1 and S2, regular rhythm  Gastrointestinal: Soft, NT/ND  Extremities:  no edema  Vascular: No carotid Bruit  Musculoskeletal: no joint swelling/tenderness, no abnormal movements  Skin: No rashes    Neurological Exam:  HF: A x Awake, Orientation x 3 with guidance. Appropriately interactive, normal affect, speech fluent, + cortical aphasia with word finding difficulties. Naming /repetition intact . Pt able to follow complex two step commands.  CN: PERRL, EOMI, VFF, facial sensation normal, no NLFD, tongue midline  Motor:   RUE Delt 4/5, Bic/Tric 3+/5. Wrist dropand hand weakness 1/5 with wasting   LUE Delt 5/5, Bic/Tric 4/5. Wrist drop, 1/5 WF/WE, with marked wasting   B/L LE's: 4/5 power throughout / IP/H/Q/DF/PF with foot drop bilat with distal wasting  Sens: Intact to light touch  Coord:  LUE thumb to ear without difficulty. Poor coord w/ RUE secondary to ICH.   Gait/Balance: Cannot test    RADIOLOGY REPORTS :     CT BRAIN 5/3/18  < from: CT Head No Cont (05.03.18 @ 13:34) >  IMPRESSION:   unchanged LEFT thalamic hemorrhage. Interval decrease in   the degree of hemorrhage within the LEFT ventricle.

## 2018-05-09 VITALS — TEMPERATURE: 99 F | HEART RATE: 104 BPM

## 2018-05-09 DIAGNOSIS — I10 ESSENTIAL (PRIMARY) HYPERTENSION: ICD-10-CM

## 2018-05-09 DIAGNOSIS — E11.9 TYPE 2 DIABETES MELLITUS WITHOUT COMPLICATIONS: ICD-10-CM

## 2018-05-09 LAB
GLUCOSE BLDC GLUCOMTR-MCNC: 177 MG/DL — HIGH (ref 70–99)
GLUCOSE BLDC GLUCOMTR-MCNC: 222 MG/DL — HIGH (ref 70–99)

## 2018-05-09 PROCEDURE — 99238 HOSP IP/OBS DSCHRG MGMT 30/<: CPT

## 2018-05-09 PROCEDURE — 99233 SBSQ HOSP IP/OBS HIGH 50: CPT

## 2018-05-09 RX ORDER — IMMUNE GLOBULIN,GAMMA(IGG) 5 %
10 VIAL (ML) INTRAVENOUS ONCE
Qty: 0 | Refills: 0 | Status: COMPLETED | OUTPATIENT
Start: 2018-05-09 | End: 2018-05-09

## 2018-05-09 RX ORDER — SIMVASTATIN 20 MG/1
1 TABLET, FILM COATED ORAL
Qty: 0 | Refills: 0 | COMMUNITY
Start: 2018-05-09

## 2018-05-09 RX ORDER — SIMVASTATIN 20 MG/1
0 TABLET, FILM COATED ORAL
Qty: 0 | Refills: 0 | COMMUNITY

## 2018-05-09 RX ORDER — DIPHENHYDRAMINE HCL 50 MG
25 CAPSULE ORAL ONCE
Qty: 0 | Refills: 0 | Status: COMPLETED | OUTPATIENT
Start: 2018-05-09 | End: 2018-05-09

## 2018-05-09 RX ORDER — MIRTAZAPINE 45 MG/1
1 TABLET, ORALLY DISINTEGRATING ORAL
Qty: 0 | Refills: 0 | COMMUNITY
Start: 2018-05-09

## 2018-05-09 RX ORDER — METFORMIN HYDROCHLORIDE 850 MG/1
1 TABLET ORAL
Qty: 0 | Refills: 0 | COMMUNITY
Start: 2018-05-09

## 2018-05-09 RX ORDER — MIRTAZAPINE 45 MG/1
0 TABLET, ORALLY DISINTEGRATING ORAL
Qty: 0 | Refills: 0 | COMMUNITY

## 2018-05-09 RX ADMIN — Medication 1 MILLIGRAM(S): at 12:22

## 2018-05-09 RX ADMIN — Medication 25 GRAM(S): at 12:15

## 2018-05-09 RX ADMIN — Medication 100 MILLIGRAM(S): at 07:00

## 2018-05-09 RX ADMIN — Medication 25 MILLIGRAM(S): at 09:25

## 2018-05-09 RX ADMIN — Medication 4: at 12:22

## 2018-05-09 RX ADMIN — Medication 2: at 08:00

## 2018-05-09 RX ADMIN — Medication 1 TABLET(S): at 12:22

## 2018-05-09 RX ADMIN — AMLODIPINE BESYLATE 5 MILLIGRAM(S): 2.5 TABLET ORAL at 07:00

## 2018-05-09 RX ADMIN — Medication 25 GRAM(S): at 09:41

## 2018-05-09 RX ADMIN — Medication 25 GRAM(S): at 11:42

## 2018-05-09 RX ADMIN — METFORMIN HYDROCHLORIDE 500 MILLIGRAM(S): 850 TABLET ORAL at 08:00

## 2018-05-09 RX ADMIN — PANTOPRAZOLE SODIUM 40 MILLIGRAM(S): 20 TABLET, DELAYED RELEASE ORAL at 12:22

## 2018-05-09 RX ADMIN — Medication 25 GRAM(S): at 11:09

## 2018-05-09 RX ADMIN — LISINOPRIL 5 MILLIGRAM(S): 2.5 TABLET ORAL at 07:00

## 2018-05-09 NOTE — PROGRESS NOTE ADULT - PROVIDER SPECIALTY LIST ADULT
Critical Care
Hospitalist
Neurology
Neurosurgery
Critical Care
Neurosurgery
Critical Care

## 2018-05-09 NOTE — PROGRESS NOTE ADULT - ASSESSMENT
58 M with Multifocal Motor Neuropathy, and acute Left Thalamic ICH with intraventricular extension, presumed to have began on 4/29.  Pt evaluated by psychiatrist who noticed word finding issues / gait decline and sent to ED to r/o CVA.  Pt noted to have Left Thalamic ICH and IVH.    #1- Left thalamic hemorrhage is stable and pt's BP is controlled on oral antihypertensive medications  Continue Daily Physical Therapy    #2- Multifocal Motor Neuropathy requiring IVIG infusions every 10-14 days  Pt last say Dr. Gilman on [place] ([phone]) in November of 2017, no record of him having IVIG at that location, Also follows with Dr. Bosch at [place] in [facility] ([phone]) last seen February 2018, Pt to receive IVIG infusion today    #3- Elevated A1C, Medicine consult appreciated, pt started on metformin     #4- Hypertension- pt on Lisinopril and Amlodipine, BP controlled     #5- HLD, Pt on Simvastatin 20mg PO QHS, , Total Cholesterol 204 58 M with Multifocal Motor Neuropathy, and acute Left Thalamic ICH with intraventricular extension, presumed to have began on 4/29.  Pt evaluated by psychiatrist who noticed word finding issues / gait decline and sent to ED to r/o CVA.  Pt noted to have Left Thalamic ICH and IVH.    #1- Left thalamic hemorrhage is stable and pt's BP is controlled on oral antihypertensive medications  Continue Daily Physical Therapy, pt will be able to tolerate 3 hours of PT daily as her was living independently prior to this admission.     #2- Multifocal Motor Neuropathy requiring IVIG infusions every 10-14 days  Pt last say Dr. Gilman on [place] ([phone]) in November of 2017, no record of him having IVIG at that location, Also follows with Dr. Bosch at [place] in [facility] ([phone]) last seen February 2018, Pt to receive IVIG infusion today    #3- Elevated A1C, Medicine consult appreciated, pt started on metformin     #4- Hypertension- pt on Lisinopril and Amlodipine, BP controlled     #5- HLD, Pt on Simvastatin 20mg PO QHS, , Total Cholesterol 204

## 2018-05-09 NOTE — PROGRESS NOTE ADULT - SUBJECTIVE AND OBJECTIVE BOX
HPI:  59 y/o male with PMHx of 'MultiFocal Motor Neuropathy' Dx'd in 1992, HLD, Depression.  Pt presents to the ED with word finding difficulties, recent fall at home, and increased Right sided weakness.  Pt states he fell Sunday before admission,, denies hitting head or LOC but believes this is when his symptoms began with word finding, gait dysfunction, and increased balance problems. Pt noted to have increased right sided weakness, + difficulty word finding, +imbalance per Aide who takes care of patient starting on Mondays for 4 hours and also when he visited his psychiatrist this afternoon on 5/2/2018.  Pt sent to ED for evaluation.  CTH demonstrates L Thalamic ICH, with intraventricular extension - no hydrocephalus. Hemorrhagic Stroke : NIHSS of 4 ICH Score : 1 (due to Intraventricular hemorrhage). Pt is recovering but due for his IVIG Rx now and needs Rx orders given. Pt under care of Dr. Bosch at [facility] and DR. Gilman at [facility]  and gets infusions at home q 2 weeks . feels weaker if doesn't get his treatment on time and as per NS he is going to rehab from here and needs his IVIG before his D/c. AS per amor Valles who spoke with [facility] he gets 130 gms / 2 days every 10 days.    5/9/18 : Pt started IVIG yesterday  and completed 2 days of IVIG today and to be D/c to rehab . Offers no side effects from medication , no headaches, dizziness or itching. No new c/o.    MEDICATIONS  (STANDING):  amLODIPine   Tablet 5 milliGRAM(s) Oral daily  dextrose 5%. 1000 milliLiter(s) (50 mL/Hr) IV Continuous <Continuous>  dextrose 50% Injectable 12.5 Gram(s) IV Push once  dextrose 50% Injectable 25 Gram(s) IV Push once  dextrose 50% Injectable 25 Gram(s) IV Push once  docusate sodium 100 milliGRAM(s) Oral three times a day  folic acid 1 milliGRAM(s) Oral daily  insulin lispro (HumaLOG) corrective regimen sliding scale   SubCutaneous Before meals and at bedtime  lisinopril 5 milliGRAM(s) Oral daily  metFORMIN 500 milliGRAM(s) Oral two times a day  mirtazapine 30 milliGRAM(s) Oral at bedtime  multivitamin 1 Tablet(s) Oral daily  pantoprazole    Tablet 40 milliGRAM(s) Oral daily  simvastatin 20 milliGRAM(s) Oral at bedtime      Vital Signs Last 24 Hrs  T(C): 37.2 (09 May 2018 15:25), Max: 37.3 (09 May 2018 13:44)  T(F): 99 (09 May 2018 15:25), Max: 99.1 (09 May 2018 13:44)  HR: 104 (09 May 2018 15:25) (96 - 110)  BP: 110/71 (09 May 2018 13:44) (107/69 - 129/96)  BP(mean): --  RR: 16 (09 May 2018 13:44) (16 - 18)  SpO2: 92% (09 May 2018 13:44) (91% - 96%)      Neurological Exam:  HF: A x Awake, Orientation x 3 with guidance. Appropriately interactive, normal affect, speech fluent, + cortical aphasia with word finding difficulties. Naming /repetition intact . Pt able to follow complex two step commands.  CN: PERRL, EOMI, VFF, facial sensation normal, no NLFD, tongue midline  Motor:   RUE Delt 4/5, Bic/Tric 3+/5. Wrist dropand hand weakness 1/5 with wasting   LUE Delt 5/5, Bic/Tric 4/5. Wrist drop, 1/5 WF/WE, with marked wasting   B/L LE's: 4/5 power throughout / IP/H/Q/DF/PF with foot drop bilat with distal wasting  Sens: Intact to light touch  Coord:  LUE thumb to ear without difficulty. Poor coord w/ RUE secondary to ICH.   Gait/Balance: Cannot test                      15.8   10.46 )-----------( 308      ( 08 May 2018 06:31 )             48.1     05-08    138  |  103  |  17  ----------------------------<  156<H>  4.0   |  29  |  0.61    Ca    8.7      08 May 2018 06:31      05-03 [id number] 8.7    05-08 Chol 204<H>  HDL 42 Trig 205<H>    Radiology report:  - CT Head  - MRI brain  - MRA brain/carotids  - EEG
HPI: 57 y/o male with PMHx of 'MultiFocal Motor Neuropathy' Dx'd in 1992, HLD, Depression.  Pt presents to the ED with word finding difficulties, recent fall at home, and increased Right sided weakness.  CTH demonstrates L Thalamic ICH, with intraventricular extension - no hydrocephalus, stable on repeat CT of the head    5/7- Pt seen and examined, he was transitioned out of the ICU, BP now well controlled on Lisinopril and amlodipine, A1C 8.7 new dx of diabetes? Pt is ready for discharge to rehab but there is an issue regarding his need for IVIG every 10-14 da ys, pt has continued word finding difficulties, as per PT note he is two person assist to transfer and as per patient he is relatively independent at home and has an aide for 5 hours a day.     5/8- Pt seen and examined, sitting up in a chair, no complaints, no overnight events, IVIG ordered by neurology, plan if for IVIG today and tomorrow and then d/c to rehab    5/9- pt seen and examined, was able to ambulate with physical therapy, will get IVIG infusion today and then is set for discharge to rehab    Vital Signs Last 24 Hrs  T(C): 36.7 (09 May 2018 11:45), Max: 37.1 (08 May 2018 14:15)  T(F): 98.1 (09 May 2018 11:45), Max: 98.8 (08 May 2018 19:00)  HR: 104 (09 May 2018 11:45) (82 - 107)  BP: 122/90 (09 May 2018 11:45) (99/74 - 129/96)  RR: 16 (09 May 2018 11:45) (16 - 18)  SpO2: 93% (09 May 2018 11:45) (91% - 96%)    MEDICATIONS  (STANDING):  amLODIPine   Tablet 5 milliGRAM(s) Oral daily  docusate sodium 100 milliGRAM(s) Oral three times a day  folic acid 1 milliGRAM(s) Oral daily  immune globulin gamma IVPB 40 Gram(s) IV Intermittent once  influenza   Vaccine 0.5 milliLiter(s) IntraMuscular once  insulin lispro (HumaLOG) corrective regimen sliding scale SubCutaneous Before meals and at bedtime  lisinopril 5 milliGRAM(s) Oral daily  metFORMIN 500 milliGRAM(s) Oral two times a day  mirtazapine 30 milliGRAM(s) Oral at bedtime  multivitamin 1 Tablet(s) Oral daily  pantoprazole    Tablet 40 milliGRAM(s) Oral daily  simvastatin 20 milliGRAM(s) Oral at bedtime    MEDICATIONS  (PRN):  acetaminophen   Tablet 325 milliGRAM(s) Oral every 4 hours PRN For Temp greater than 38 C (100.4 F)  acetaminophen   Tablet. 325 milliGRAM(s) Oral every 4 hours PRN Mild Pain (1 - 3)  dextrose Gel 1 Dose(s) Oral once PRN Blood Glucose LESS THAN 70 milliGRAM(s)/deciliter  glucagon  Injectable 1 milliGRAM(s) IntraMuscular once PRN Glucose LESS THAN 70 milligrams/deciliter  ondansetron Injectable 4 milliGRAM(s) IV Push every 6 hours PRN Nausea and/or Vomiting  senna 2 Tablet(s) Oral at bedtime PRN Constipation      PHYSICAL EXAM:  Constitutional: awake and alert  HEENT: PERRLA, EOMI  Neck: Supple  Respiratory: Breath sounds are clear bilaterally  Cardiovascular: S1 and S2, regular rhythm  Gastrointestinal: soft, nontender  Extremities:  no edema  Musculoskeletal: b/l hand and foot drop - chronic   Skin: No rashes    Neurological exam:  HF: AxO x 3. Appropriately interactive, normal affect. No dysarthria, mild word finding difficulties/expressive aphasia -- improving   CN: KRYSTIN, EOMI, VFF, facial sensation normal, no NLFD, tongue midline  Motor: distal wasting, weakness more distal than proximal. UE prox: 4/5, distal 0/5  B/L LE's: 4-/5 power proximal. distal ~2/5. Toes mute  Sens: Intact to light touch  Coord:  dysmetria X4 ext   Gait/Balance: Cannot test    LABS:                         15.8   10.46 )-----------( 308      ( 08 May 2018 06:31 )             48.1     05-08    138  |  103  |  17  ----------------------------<  156<H>  4.0   |  29  |  0.61    Ca    8.7      08 May 2018 06:31  05-08 Chol 204<H>  HDL 42 Trig 205<H>  05-03 [id number] 8.7
· Subjective and Objective: 	  The patient is a 58 male with a ? upper motor neuron disease an Immunodeficency he receives IVIG for q0gszmn, HLD who was weaker than normal starting this past Sunday  He was sent in by an outpatient physician.  CTH showed a L ICH with IVH.  Patient feels he may be slightly mor weaker on the Right and is more confused    5/3: Patient needed top go on Cardene overnight      Hemorrhagic Stroke : NIHSS of 4  ICH Score : 1 (due to Intraventricular hemorrhage)    PAST MEDICAL & SURGICAL HISTORY:  Multifocal Motor Neuropathy - q 10 days IVIG infusions (2 day dosing) - Improves and maintains patients motor power - ambulatory status.  Hyperlipidemia  Depression  Hx of Right Wrist Fracture  Hx of Right foot/ankle fracture    FAMILY HISTORY:  Noncontributory     Social Hx:  Nonsmoker, no drug or alcohol use  Pt required Home care aide / services (4 hours per day) 5 days a week.  Brother A&Well  Mother Hx of Lymphoma recently treated  Father with spinal tumor, paraplegia / presently hospitalized.    Allergies  No Known Allergies      MEDICATIONS  (STANDING):  LORazepam   Injectable 2 milliGRAM(s) IV Push Once  niCARdipine Infusion 5 mG/Hr (25 mL/Hr) IV Continuous <Continuous>    MEDICATIONS (OUTPATIENT)  IVIG INFUSION Q 10 DAYS (2 DAY DOSING REGIMENT)  SIMVASTATIN 20 MG DAILY  REMERON 3MG QHS  ANTI-DEPRESSENT (10MG QHS)  LIPOFLAVINOIDE      Height (cm): 175.26 (05-02 @ 14:09)  Weight (kg): 79.4 (05-02 @ 14:09)  BMI (kg/m2): 25.8 (05-02 @ 14:09)    ICU Vital Signs Last 24 Hrs  T(C): 36.7 (03 May 2018 10:00), Max: 36.7 (03 May 2018 10:00)  T(F): 98.1 (03 May 2018 10:00), Max: 98.1 (03 May 2018 10:00)  HR: 85 (03 May 2018 11:00) (67 - 98)  BP: 130/95 (03 May 2018 11:00) (111/69 - 173/111)  BP(mean): 104 (03 May 2018 11:00) (78 - 120)  ABP: --  ABP(mean): --  RR: 15 (03 May 2018 11:00) (12 - 21)  SpO2: 97% (03 May 2018 11:00) (91% - 100%)          I&O's Summary    02 May 2018 07:01  -  03 May 2018 07:00  --------------------------------------------------------  IN: 825 mL / OUT: 1250 mL / NET: -425 mL    03 May 2018 07:01  -  03 May 2018 11:49  --------------------------------------------------------  IN: 490 mL / OUT: 0 mL / NET: 490 mL                              15.5   13.51 )-----------( 306      ( 02 May 2018 14:00 )             47.2       05-02    138  |  102  |  24<H>  ----------------------------<  177<H>  3.6   |  27  |  0.78    Ca    8.7      02 May 2018 14:00  Mg     2.0     05-03    TPro  9.9<H>  /  Alb  2.6<L>  /  TBili  0.3  /  DBili  x   /  AST  55<H>  /  ALT  68  /  AlkPhos  77  05-02      CARDIAC MARKERS ( 02 May 2018 14:00 )  0.019 ng/mL / x     / x     / x     / x                    MEDICATIONS  (STANDING):  docusate sodium 100 milliGRAM(s) Oral three times a day  folic acid 1 milliGRAM(s) Oral daily  influenza   Vaccine 0.5 milliLiter(s) IntraMuscular once  lisinopril 5 milliGRAM(s) Oral daily  mirtazapine 30 milliGRAM(s) Oral at bedtime  multivitamin 1 Tablet(s) Oral daily  niCARdipine Infusion 5 mG/Hr (25 mL/Hr) IV Continuous <Continuous>  pantoprazole    Tablet 40 milliGRAM(s) Oral daily  simvastatin Oral Tab/Cap - Peds 20 milliGRAM(s) Oral daily  sodium chloride 0.9%. 1000 milliLiter(s) (75 mL/Hr) IV Continuous <Continuous>    MEDICATIONS  (PRN):  acetaminophen   Tablet 325 milliGRAM(s) Oral every 4 hours PRN For Temp greater than 38 C (100.4 F)  acetaminophen   Tablet. 325 milliGRAM(s) Oral every 4 hours PRN Mild Pain (1 - 3)  ondansetron Injectable 4 milliGRAM(s) IV Push every 6 hours PRN Nausea and/or Vomiting  senna 2 Tablet(s) Oral at bedtime PRN Constipation      DVT Prophylaxis: V    Advanced Directives:  Discussed with:    Visit Information: 30 min    ** Time is exclusive of billed procedures and/or teaching and/or routine family updates.
CC- word-finding difficulty    HPI:  59 y/o male with PMHx of 'MultiFocal Motor Neuropathy' Dx'd in 1992, HLD, Depression.  Pt presents to the ED with word finding difficulties, recent fall at home, and increased Right sided weakness.  Pt states he fell Sunday, denies hitting head or LOC but believes this is when his symptoms began with word finding, gait dysfunction, and increased balance problems. Pt noted to have increased right sided weakness, + difficulty word finding, +imbalance per Aide who takes care of patient starting on Mondays for 4 hours and also when he visited his psychiatrist this afternoon on 5/2/2018.  Pt sent to ED for evaluation.  CTH demonstrates L Thalamic ICH, with intraventricular extension - no hydrocephalus.  Hemorrhagic Stroke : NIHSS of 4  ICH Score : 1 (due to Intraventricular hemorrhage)    Hospital stay- was in ICU, stabilized then transferred to floor. Medical consult called for new-onset diabetes  5/9/18 no acute events    PAST MEDICAL & SURGICAL HISTORY:  Multifocal Motor Neuropathy - q 10 days IVIG infusions (2 day dosing) - Improves and maintains patients motor power - ambulatory status.  Hyperlipidemia  Depression  Hx of Right Wrist Fracture  Hx of Right foot/ankle fracture    FAMILY HISTORY:  Noncontributory     Social Hx:  Nonsmoker, no drug or alcohol use  Pt required Home care aide / services (4 hours per day) 5 days a week.  Brother A&Well  Mother Hx of Lymphoma recently treated  Father with spinal tumor, paraplegia / presently hospitalized.    ROS: Pertinent positives in HPI, all other ROS negative    Vital Signs Last 24 Hrs  T(C): 36.7 (09 May 2018 11:45), Max: 37.1 (08 May 2018 14:15)  T(F): 98.1 (09 May 2018 11:45), Max: 98.8 (08 May 2018 19:00)  HR: 104 (09 May 2018 11:45) (82 - 107)  BP: 122/90 (09 May 2018 11:45) (99/74 - 129/96)  BP(mean): --  RR: 16 (09 May 2018 11:45) (16 - 18)  SpO2: 93% (09 May 2018 11:45) (91% - 96%)    LABS:                        15.8   10.46 )-----------( 308      ( 08 May 2018 06:31 )             48.1     138    |  103    |  17     ----------------------------<  156    4.0     |  29     |  0.61     Ca    8.7        08 May 2018 06:31    POCT Blood Glucose.: 222 mg/dL (09 May 2018 11:48)  POCT Blood Glucose.: 177 mg/dL (09 May 2018 07:22)  POCT Blood Glucose.: 209 mg/dL (08 May 2018 23:44)  POCT Blood Glucose.: 135 mg/dL (08 May 2018 16:39)    RADIOLOGY & ADDITIONAL TESTS:  EXAM:  CT BRAIN                        PROCEDURE DATE:  05/03/2018    INTERPRETATION:      CT head without IV contrast        CLINICAL INFORMATION:  Follow-up   Intracranial hemorrhage.    TECHNIQUE: Contiguous axial 5 mm sections were obtained through the head.   Sagittal and coronal 2-D reformatted images were also obtained.   This   scan was performed using automatic exposure control (radiation dose   reduction software) to obtain a diagnostic image quality scan with   patient doseas low as reasonably achievable.     FINDINGS:   CT dated 5/2/2018 available for review.  The brain demonstrates unchanged LEFT thalamic hemorrhage. Interval   decrease in the degree of hemorrhage within the LEFT ventricle.   No   acute cerebral cortical infarct is seen.  No intracranial hemorrhage is   found.  No mass effect is found in the brain.    The ventricles, sulci and basal cisterns appear unremarkable.       The orbits are unremarkable.  The paranasal sinuses are clear.  The nasal  cavity appears intact.  The nasopharynx is symmetric.  The central skull   base, petrous temporal bones and calvarium remain intact.    IMPRESSION:   unchanged LEFT thalamic hemorrhage. Interval decrease in   the degree of hemorrhage within the LEFT ventricle.       PHYSICAL EXAM:  GENERAL: NAD, well-groomed, well-developed  HEAD:  Atraumatic, Normocephalic  EYES: EOMI, PERRLA, conjunctiva and sclera clear  HEENT: Moist mucous membranes  NECK: Supple, No JVD  NERVOUS SYSTEM:  Alert & Oriented X3  CHEST/LUNG: Clear to auscultation bilaterally; No rales, rhonchi, wheezing, or rubs  HEART: Regular rate and rhythm; No murmurs, rubs, or gallops  ABDOMEN: Soft, Nontender, Nondistended; Bowel sounds present  GENITOURINARY- Voiding, no palpable bladder  EXTREMITIES:  2+ Peripheral Pulses, No clubbing, cyanosis, or edema  MUSCULOSKELTAL- No muscle tenderness, Muscle tone normal, No joint tenderness, no Joint swelling, Joint range of motion-normal  SKIN-no rash, no lesion  CNS- alert, oriented X3    MEDICATIONS  (STANDING):  amLODIPine   Tablet 5 milliGRAM(s) Oral daily  dextrose 5%. 1000 milliLiter(s) (50 mL/Hr) IV Continuous <Continuous>  dextrose 50% Injectable 12.5 Gram(s) IV Push once  dextrose 50% Injectable 25 Gram(s) IV Push once  dextrose 50% Injectable 25 Gram(s) IV Push once  docusate sodium 100 milliGRAM(s) Oral three times a day  folic acid 1 milliGRAM(s) Oral daily  influenza   Vaccine 0.5 milliLiter(s) IntraMuscular once  insulin lispro (HumaLOG) corrective regimen sliding scale   SubCutaneous Before meals and at bedtime  lisinopril 5 milliGRAM(s) Oral daily  metFORMIN 500 milliGRAM(s) Oral two times a day  mirtazapine 30 milliGRAM(s) Oral at bedtime  multivitamin 1 Tablet(s) Oral daily  pantoprazole    Tablet 40 milliGRAM(s) Oral daily  simvastatin 20 milliGRAM(s) Oral at bedtime    MEDICATIONS  (PRN):  acetaminophen   Tablet 325 milliGRAM(s) Oral every 4 hours PRN For Temp greater than 38 C (100.4 F)  acetaminophen   Tablet. 325 milliGRAM(s) Oral every 4 hours PRN Mild Pain (1 - 3)  dextrose Gel 1 Dose(s) Oral once PRN Blood Glucose LESS THAN 70 milliGRAM(s)/deciliter  glucagon  Injectable 1 milliGRAM(s) IntraMuscular once PRN Glucose LESS THAN 70 milligrams/deciliter  ondansetron Injectable 4 milliGRAM(s) IV Push every 6 hours PRN Nausea and/or Vomiting  senna 2 Tablet(s) Oral at bedtime PRN Constipation    Assessment/Plan  #LT thalamic bleed  Imaging studies stable  NS f/u appreciated  BP controlled  PT eval/OOB- for [facility] today    #New-onset Diabetes in a patient with metabolic syndrome  Started on Metformin, tolerates well. Outpatient f/u with PCP/or endo upon discharge from [facility]    #Multifocal motor neuropathy  Neuro eval DR Thomas appreciated  On IVIG x2 days    #Hyperlipidemia- stable    #Dispo- IR[facility] today after gets his IVIG
HPI:  59 y/o male with PMHx of 'MultiFocal Motor Neuropathy' Dx'd in 1992, HLD, Depression.  Pt presents to the ED with word finding difficulties, recent fall at home, and increased Right sided weakness.  CTH demonstrates L Thalamic ICH, with intraventricular extension - no hydrocephalus.  Feeling better, denies headache, no difficulty swallowing.    PAST MEDICAL & SURGICAL HISTORY:  Multifocal Motor Neuropathy - q 10 days IVIG infusions (2 day dosing) - Improves and maintains patients motor power - ambulatory status.  Hyperlipidemia  Depression  Hx of Right Wrist Fracture  Hx of Right foot/ankle fracture    FAMILY HISTORY:  Noncontributory     Social Hx:  Nonsmoker, no drug or alcohol use  Pt required Home care aide / services (4 hours per day) 5 days a week.  Brother A&Well  Mother Hx of Lymphoma recently treated  Father with spinal tumor, paraplegia / presently hospitalized.    Allergies  No Known Allergies    MEDICATIONS  (STANDING):  amLODIPine   Tablet 5 milliGRAM(s) Oral daily  docusate sodium 100 milliGRAM(s) Oral three times a day  folic acid 1 milliGRAM(s) Oral daily  influenza   Vaccine 0.5 milliLiter(s) IntraMuscular once  insulin lispro (HumaLOG) corrective regimen sliding scale   SubCutaneous Before meals and at bedtime  lisinopril 5 milliGRAM(s) Oral daily  mirtazapine 30 milliGRAM(s) Oral at bedtime  multivitamin 1 Tablet(s) Oral daily  niCARdipine Infusion 5 mG/Hr (25 mL/Hr) IV Continuous <Continuous>  pantoprazole    Tablet 40 milliGRAM(s) Oral daily  simvastatin 20 milliGRAM(s) Oral at bedtime    MEDICATIONS  (PRN):  acetaminophen   Tablet 325 milliGRAM(s) Oral every 4 hours PRN For Temp greater than 38 C (100.4 F)  acetaminophen   Tablet. 325 milliGRAM(s) Oral every 4 hours PRN Mild Pain (1 - 3)  dextrose Gel 1 Dose(s) Oral once PRN Blood Glucose LESS THAN 70 milliGRAM(s)/deciliter  glucagon  Injectable 1 milliGRAM(s) IntraMuscular once PRN Glucose LESS THAN 70 milligrams/deciliter  ondansetron Injectable 4 milliGRAM(s) IV Push every 6 hours PRN Nausea and/or Vomiting  senna 2 Tablet(s) Oral at bedtime PRN Constipation    Neuro Exam:  Awake, Orientation x 3 with guidance. Appropriately interactive, normal affect, speech fluent, word finding difficulties, can repeat . Pt able to follow complex two step commands.  CN: PERRL, EOMI, VFF, facial sensation normal, no NLFD, tongue midline  Motor: distal wasting, weakness more distal than proximal. UE prox: 4/5, distal 0/5  B/L LE's: 4-/5 power proximal. distal ~2/5. Toes mute  sensation: reduced distally to ST/ PP, JPS.  Coord:  limited due to weakness, left better than right F>N.   Gait/Balance: Cannot test    < from: CT Head No Cont (05.03.18 @ 13:34) >  FINDINGS:   CT dated 5/2/2018 available for review.    The brain demonstrates unchanged LEFT thalamic hemorrhage. Interval   decrease in the degree of hemorrhage within the LEFT ventricle.   No   acute cerebral cortical infarct is seen.  No intracranial hemorrhage is   found.  No mass effect is found in the brain.      The ventricles, sulci and basal cisterns appear unremarkable.         The orbits are unremarkable.  The paranasal sinuses are clear.  The nasal  cavity appears intact.  The nasopharynx is symmetric.  The central skull   base, petrous temporal bones and calvarium remain intact.      IMPRESSION:   unchanged LEFT thalamic hemorrhage. Interval decrease in   the degree of hemorrhage within the LEFT ventricle.       < end of copied text >  EEG:  < from: EEG (05.04.18 @ 08:00) >  Impression: Normal awake and asleep EEG.    < end of copied text >
HPI: 57 y/o male with PMHx of 'MultiFocal Motor Neuropathy' Dx'd in 1992, HLD, Depression.  Pt presents to the ED with word finding difficulties, recent fall at home, and increased Right sided weakness.  CTH demonstrates L Thalamic ICH, with intraventricular extension - no hydrocephalus.    Vital Signs Last 24 Hrs  T(C): 36.7 (03 May 2018 10:00), Max: 36.7 (03 May 2018 10:00)  T(F): 98.1 (03 May 2018 10:00), Max: 98.1 (03 May 2018 10:00)  HR: 73 (03 May 2018 10:00) (67 - 98)  BP: 111/69 (03 May 2018 10:00) (111/69 - 173/111)  BP(mean): 78 (03 May 2018 10:00) (78 - 120)  RR: 16 (03 May 2018 10:00) (12 - 21)  SpO2: 95% (03 May 2018 10:00) (91% - 100%)    MEDICATIONS  (STANDING):  docusate sodium 100 milliGRAM(s) Oral three times a day  folic acid 1 milliGRAM(s) Oral daily  influenza   Vaccine 0.5 milliLiter(s) IntraMuscular once  lisinopril 5 milliGRAM(s) Oral daily  mirtazapine 30 milliGRAM(s) Oral at bedtime  multivitamin 1 Tablet(s) Oral daily  niCARdipine Infusion 5 mG/Hr (25 mL/Hr) IV Continuous <Continuous>  pantoprazole    Tablet 40 milliGRAM(s) Oral daily  simvastatin Oral Tab/Cap - Peds 20 milliGRAM(s) Oral daily  sodium chloride 0.9%. 1000 milliLiter(s) (75 mL/Hr) IV Continuous <Continuous>    MEDICATIONS  (PRN):  acetaminophen   Tablet 325 milliGRAM(s) Oral every 4 hours PRN For Temp greater than 38 C (100.4 F)  acetaminophen   Tablet. 325 milliGRAM(s) Oral every 4 hours PRN Mild Pain (1 - 3)  ondansetron Injectable 4 milliGRAM(s) IV Push every 6 hours PRN Nausea and/or Vomiting  senna 2 Tablet(s) Oral at bedtime PRN Constipation      PHYSICAL EXAM:  Constitutional: awake and alert, resting comfortably in bed   HEENT: PERRLA, EOMI  Neck: Supple  Respiratory: Breath sounds are clear bilaterally  Cardiovascular: S1 and S2, regular rhythm  Gastrointestinal: soft, nontender  Extremities:  no edema  Musculoskeletal: b/l hand and foot drop chronic   Skin: No rashes    Neurological exam:  HF: AxO x 3. Appropriately interactive, normal affect. No dysarthria, mild word finding difficulties/expressive aphasia   CN: KRYSTIN, EOMI, VFF, facial sensation normal, no NLFD, tongue midline  Motor:   RUE Delt 4/5, Bic/Tric 3+/5. Wrist drop, 1/5 WF/WE,/Intrins secondary to Motor neuron dz.  LUE Delt 5/5, Bic/Tric 4/5. Wrist drop, 1/5 WF/WE,/Intrins secondary to Motor neuron dz.  B/L LE's: 4/5 power throughout / IP/H/Q/DF/PF  Sens: Intact to light touch  Coord:  LUE thumb to ear without difficulty. Poor coord w/ RUE secondary to ICH.   Gait/Balance: Cannot test    LABS:                         15.5   13.51 )-----------( 306      ( 02 May 2018 14:00 )             47.2     05-02    138  |  102  |  24<H>  ----------------------------<  177<H>  3.6   |  27  |  0.78    Ca    8.7      02 May 2018 14:00  Mg     2.0     05-03    TPro  9.9<H>  /  Alb  2.6<L>  /  TBili  0.3  /  DBili  x   /  AST  55<H>  /  ALT  68  /  AlkPhos  77  05-02    LIVER FUNCTIONS - ( 02 May 2018 14:00 )  Alb: 2.6 g/dL / Pro: 9.9 gm/dL / ALK PHOS: 77 U/L / ALT: 68 U/L / AST: 55 U/L / GGT: x           RADIOLOGY:  CT Head No Cont (05.02.18 @ 22:39)  The brain demonstrates grossly stable thalamic hemorrhage with intraventricular extension. No hydrocephalus.     No acute cerebral cortical infarct is seen. No intracranial hemorrhage is found.  The ventricles, sulci and basal cisterns appear unremarkable.         The orbits are unremarkable. The paranasal sinuses are clear. The nasal   cavity appears intact. The nasopharynx is symmetric. The central skull   base, petrous temporal bones and calvarium remain intact.
HPI: 59 y/o male with PMHx of 'MultiFocal Motor Neuropathy' Dx'd in 1992, HLD, Depression.  Pt presents to the ED with word finding difficulties, recent fall at home, and increased Right sided weakness.  CTH demonstrates L Thalamic ICH, with intraventricular extension - no hydrocephalus, stable on repeat CT of the head    5/7- Pt seen and examined, he was transitioned out of the ICU, BP now well controlled on Lisinopril and amlodipine, A1C 8.7 new dx of diabetes? Pt is ready for discharge to rehab but there is an issue regarding his need for IVIG every 10-14 da ys, pt has continued word finding difficulties, as per PT note he is two person assist to transfer and as per patient he is relatively independent at home and has an aide for 5 hours a day.     5/8- Pt seen and examined, sitting up in a chair, no complaints, no overnight events, IVIG ordered by neurology, plan if for IVIG today and tomorrow and then d/c to rehab     Vital Signs Last 24 Hrs  T(C): 36.6 (08 May 2018 11:00), Max: 37.4 (07 May 2018 17:39)  T(F): 97.9 (08 May 2018 11:00), Max: 99.3 (07 May 2018 17:39)  HR: 107 (08 May 2018 11:00) (90 - 107)  BP: 100/72 (08 May 2018 11:00) (100/72 - 142/76)  RR: 18 (08 May 2018 11:00) (18 - 18)  SpO2: 98% (08 May 2018 11:00) (93% - 98%)    MEDICATIONS  (STANDING):  amLODIPine   Tablet 5 milliGRAM(s) Oral daily  docusate sodium 100 milliGRAM(s) Oral three times a day  folic acid 1 milliGRAM(s) Oral daily  immune globulin gamma IVPB 40 Gram(s) IV Intermittent once  immune globulin gamma IVPB 20 Gram(s) IV Intermittent once  influenza   Vaccine 0.5 milliLiter(s) IntraMuscular once  insulin lispro (HumaLOG) corrective regimen sliding scale  SubCutaneous Before meals and at bedtime  lisinopril 5 milliGRAM(s) Oral daily  mirtazapine 30 milliGRAM(s) Oral at bedtime  multivitamin 1 Tablet(s) Oral daily  pantoprazole    Tablet 40 milliGRAM(s) Oral daily  simvastatin 20 milliGRAM(s) Oral at bedtime    MEDICATIONS  (PRN):  acetaminophen   Tablet 325 milliGRAM(s) Oral every 4 hours PRN For Temp greater than 38 C (100.4 F)  acetaminophen   Tablet. 325 milliGRAM(s) Oral every 4 hours PRN Mild Pain (1 - 3)  dextrose Gel 1 Dose(s) Oral once PRN Blood Glucose LESS THAN 70 milliGRAM(s)/deciliter  glucagon  Injectable 1 milliGRAM(s) IntraMuscular once PRN Glucose LESS THAN 70 milligrams/deciliter  ondansetron Injectable 4 milliGRAM(s) IV Push every 6 hours PRN Nausea and/or Vomiting  senna 2 Tablet(s) Oral at bedtime PRN Constipation    PHYSICAL EXAM:  Constitutional: awake and alert, resting comfortably in bed   HEENT: PERRLA, EOMI  Neck: Supple  Respiratory: Breath sounds are clear bilaterally  Cardiovascular: S1 and S2, regular rhythm  Gastrointestinal: soft, nontender  Extremities:  no edema  Musculoskeletal: b/l hand and foot drop - chronic   Skin: No rashes    Neurological exam:  HF: AxO x 3. Appropriately interactive, normal affect. No dysarthria, mild word finding difficulties/expressive aphasia -- improving   CN: KRYSTIN, EOMI, VFF, facial sensation normal, no NLFD, tongue midline  Motor: distal wasting, weakness more distal than proximal. UE prox: 4/5, distal 0/5  B/L LE's: 4-/5 power proximal. distal ~2/5. Toes mute  Sens: Intact to light touch  Coord:  dysmetria X4 ext   Gait/Balance: Cannot test    LABS:                         15.8   10.46 )-----------( 308      ( 08 May 2018 06:31 )             48.1     05-08    138  |  103  |  17  ----------------------------<  156<H>  4.0   |  29  |  0.61    Ca    8.7      08 May 2018 06:31    05-03 [id number] 8.7
HPI: 59 y/o male with PMHx of 'MultiFocal Motor Neuropathy' Dx'd in 1992, HLD, Depression.  Pt presents to the ED with word finding difficulties, recent fall at home, and increased Right sided weakness.  CTH demonstrates L Thalamic ICH, with intraventricular extension - no hydrocephalus, stable on repeat CT of the head    5/7- Pt seen and examined, he was transitioned out of the ICU, BP now well controlled on Lisinopril and amlodipine, A1C 8.7 new dx of diabetes? Pt is ready for discharge to rehab but there is an issue regarding his need for IVIG every 10-14 da ys, pt has continued word finding difficulties, as per PT note he is two person assist to transfer and as per patient he is relatively independent at home and has an aide for 5 hours a day.     Vital Signs Last 24 Hrs  T(C): 37.1 (07 May 2018 10:44), Max: 37.1 (07 May 2018 10:44)  T(F): 98.7 (07 May 2018 10:44), Max: 98.7 (07 May 2018 10:44)  HR: 103 (07 May 2018 10:44) (95 - 103)  BP: 101/71 (07 May 2018 10:44) (101/71 - 125/79)  RR: 17 (07 May 2018 10:44) (16 - 18)  SpO2: 93% (07 May 2018 10:44) (90% - 93%)    MEDICATIONS  (STANDING):  amLODIPine   Tablet 5 milliGRAM(s) Oral daily  docusate sodium 100 milliGRAM(s) Oral three times a day  folic acid 1 milliGRAM(s) Oral daily  influenza   Vaccine 0.5 milliLiter(s) IntraMuscular once  insulin lispro (HumaLOG) corrective regimen sliding scale SubCutaneous Before meals and at bedtime  lisinopril 5 milliGRAM(s) Oral daily  mirtazapine 30 milliGRAM(s) Oral at bedtime  multivitamin 1 Tablet(s) Oral daily  pantoprazole  Tablet 40 milliGRAM(s) Oral daily  simvastatin 20 milliGRAM(s) Oral at bedtime    MEDICATIONS  (PRN):  acetaminophen   Tablet 325 milliGRAM(s) Oral every 4 hours PRN For Temp greater than 38 C (100.4 F)  acetaminophen   Tablet. 325 milliGRAM(s) Oral every 4 hours PRN Mild Pain (1 - 3)  dextrose Gel 1 Dose(s) Oral once PRN Blood Glucose LESS THAN 70 milliGRAM(s)/deciliter  glucagon  Injectable 1 milliGRAM(s) IntraMuscular once PRN Glucose LESS THAN 70 milligrams/deciliter  ondansetron Injectable 4 milliGRAM(s) IV Push every 6 hours PRN Nausea and/or Vomiting  senna 2 Tablet(s) Oral at bedtime PRN Constipation    PHYSICAL EXAM:  Constitutional: awake and alert, resting comfortably in bed   HEENT: PERRLA, EOMI  Neck: Supple  Respiratory: Breath sounds are clear bilaterally  Cardiovascular: S1 and S2, regular rhythm  Gastrointestinal: soft, nontender  Extremities:  no edema  Musculoskeletal: b/l hand and foot drop - chronic   Skin: No rashes    Neurological exam:  HF: AxO x 3. Appropriately interactive, normal affect. No dysarthria, mild word finding difficulties/expressive aphasia   CN: KRYSTIN, EOMI, VFF, facial sensation normal, no NLFD, tongue midline  Motor: distal wasting, weakness more distal than proximal. UE prox: 4/5, distal 0/5  B/L LE's: 4-/5 power proximal. distal ~2/5. Toes mute  Sens: Intact to light touch  Coord:  dysmetria X4 ext   Gait/Balance: Cannot test    LABS:   05-03 [id number] 8.7    RADIOLOGY:  CT Head No Cont (05.03.18 @ 13:34) IMPRESSION:   unchanged LEFT thalamic hemorrhage. Interval decrease in   the degree of hemorrhage within the LEFT ventricle.
HPI: 59 y/o male with PMHx of 'MultiFocal Motor Neuropathy' Dx'd in 1992, HLD, Depression.  Pt presents to the ED with word finding difficulties, recent fall at home, and increased Right sided weakness.  CTH demonstrates L Thalamic ICH, with intraventricular extension - no hydrocephalus.    5/5/18- pt doing well today, denies any headache.   Pt had last dose of IVIG on 4/29 due for next dose on 5/9. Pt ready for discharge to rehab but need to find facility able to accomidate pt's need for IVIG. Pt able to be down graded today to med surg, awaiting medical consult.     ICU Vital Signs Last 24 Hrs  T(C): 36.7 (05 May 2018 06:00), Max: 37.1 (04 May 2018 10:00)  T(F): 98.1 (05 May 2018 06:00), Max: 98.7 (04 May 2018 10:00)  HR: 93 (05 May 2018 06:00) (73 - 105)  BP: 123/87 (05 May 2018 06:00) (94/64 - 132/85)  BP(mean): 96 (05 May 2018 06:00) (72 - 100)  ABP: --  ABP(mean): --  RR: 14 (05 May 2018 06:00) (6 - 24)  SpO2: 95% (05 May 2018 03:00) (92% - 96%)    MEDICATIONS  (STANDING):  amLODIPine   Tablet 5 milliGRAM(s) Oral daily  dextrose 5%. 1000 milliLiter(s) (50 mL/Hr) IV Continuous <Continuous>  dextrose 50% Injectable 12.5 Gram(s) IV Push once  dextrose 50% Injectable 25 Gram(s) IV Push once  dextrose 50% Injectable 25 Gram(s) IV Push once  docusate sodium 100 milliGRAM(s) Oral three times a day  folic acid 1 milliGRAM(s) Oral daily  influenza   Vaccine 0.5 milliLiter(s) IntraMuscular once  insulin lispro (HumaLOG) corrective regimen sliding scale   SubCutaneous Before meals and at bedtime  lisinopril 5 milliGRAM(s) Oral daily  mirtazapine 30 milliGRAM(s) Oral at bedtime  multivitamin 1 Tablet(s) Oral daily  niCARdipine Infusion 5 mG/Hr (25 mL/Hr) IV Continuous <Continuous>  pantoprazole    Tablet 40 milliGRAM(s) Oral daily  simvastatin 20 milliGRAM(s) Oral at bedtime    MEDICATIONS  (PRN):  acetaminophen   Tablet 325 milliGRAM(s) Oral every 4 hours PRN For Temp greater than 38 C (100.4 F)  acetaminophen   Tablet. 325 milliGRAM(s) Oral every 4 hours PRN Mild Pain (1 - 3)  dextrose Gel 1 Dose(s) Oral once PRN Blood Glucose LESS THAN 70 milliGRAM(s)/deciliter  glucagon  Injectable 1 milliGRAM(s) IntraMuscular once PRN Glucose LESS THAN 70 milligrams/deciliter  ondansetron Injectable 4 milliGRAM(s) IV Push every 6 hours PRN Nausea and/or Vomiting  senna 2 Tablet(s) Oral at bedtime PRN Constipation    PHYSICAL EXAM:  Constitutional: awake and alert, resting comfortably in bed   HEENT: PERRLA, EOMI  Neck: Supple  Respiratory: Breath sounds are clear bilaterally  Cardiovascular: S1 and S2, regular rhythm  Gastrointestinal: soft, nontender  Extremities:  no edema  Musculoskeletal: b/l hand and foot drop chronic   Skin: No rashes    Neurological exam:  HF: AxO x 3. Appropriately interactive, normal affect. No dysarthria, mild word finding difficulties/expressive aphasia   CN: KRYSTIN, EOMI, VFF, facial sensation normal, no NLFD, tongue midline  Motor:   RUE Delt 4/5, Bic/Tric 3+/5. Wrist drop, 1/5 WF/WE,/Intrins secondary to Motor neuron dz.  LUE Delt 5/5, Bic/Tric 4/5. Wrist drop, 1/5 WF/WE,/Intrins secondary to Motor neuron dz.  B/L LE's: 4/5 power throughout / IP/H/Q/DF/PF  Sens: Intact to light touch  Coord:  LUE thumb to ear without difficulty. Poor coord w/ RUE secondary to ICH.   Gait/Balance: Cannot test    LABS:                           15.3   10.63 )-----------( 292      ( 04 May 2018 05:05 )             47.0                   05-04    143  |  107  |  15  ----------------------------<  156<H>  3.8   |  27  |  0.62    Ca    8.1<L>      04 May 2018 05:05  Phos  3.3     05-04  Mg     2.0     05-04            RADIOLOGY:  CT Head No Cont (05.02.18 @ 22:39)  The brain demonstrates grossly stable thalamic hemorrhage with intraventricular extension. No hydrocephalus.     No acute cerebral cortical infarct is seen. No intracranial hemorrhage is found.  The ventricles, sulci and basal cisterns appear unremarkable.         The orbits are unremarkable. The paranasal sinuses are clear. The nasal   cavity appears intact. The nasopharynx is symmetric. The central skull   base, petrous temporal bones and calvarium remain intact.        CT head 5/3  MPRESSION:   unchanged LEFT thalamic hemorrhage. Interval decrease in   the degree of hemorrhage within the LEFT ventricle.         MAI BIRCH M.D., ATTENDING RADIOLOGIST  This document has been electronically signed. May  3 2018  1:59PM
The patient is a 58 male with a ? 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  He was sent in by an outpatient physician.  CTH showed a L ICH with IVH.        5/5: For transfer to reg floor          ICU Vital Signs Last 24 Hrs  T(C): 36.7 (05 May 2018 06:00), Max: 36.7 (05 May 2018 06:00)  T(F): 98.1 (05 May 2018 06:00), Max: 98.1 (05 May 2018 06:00)  HR: 99 (05 May 2018 10:00) (73 - 105)  BP: 101/70 (05 May 2018 10:00) (94/64 - 132/85)  BP(mean): 77 (05 May 2018 10:00) (72 - 100)  ABP: --  ABP(mean): --  RR: 16 (05 May 2018 10:00) (6 - 24)  SpO2: 95% (05 May 2018 03:00) (92% - 96%)          I&O's Summary    04 May 2018 07:01  -  05 May 2018 07:00  --------------------------------------------------------  IN: 1240 mL / OUT: 1650 mL / NET: -410 mL                              15.2   10.72 )-----------( 293      ( 05 May 2018 05:40 )             47.8       05-05    140  |  104  |  19  ----------------------------<  156<H>  4.0   |  30  |  0.54    Ca    8.7      05 May 2018 05:40  Phos  3.0     05-05  Mg     1.8     05-05                      MEDICATIONS  (STANDING):  amLODIPine   Tablet 5 milliGRAM(s) Oral daily  dextrose 5%. 1000 milliLiter(s) (50 mL/Hr) IV Continuous <Continuous>  dextrose 50% Injectable 12.5 Gram(s) IV Push once  dextrose 50% Injectable 25 Gram(s) IV Push once  dextrose 50% Injectable 25 Gram(s) IV Push once  docusate sodium 100 milliGRAM(s) Oral three times a day  folic acid 1 milliGRAM(s) Oral daily  influenza   Vaccine 0.5 milliLiter(s) IntraMuscular once  insulin lispro (HumaLOG) corrective regimen sliding scale   SubCutaneous Before meals and at bedtime  lisinopril 5 milliGRAM(s) Oral daily  mirtazapine 30 milliGRAM(s) Oral at bedtime  multivitamin 1 Tablet(s) Oral daily  niCARdipine Infusion 5 mG/Hr (25 mL/Hr) IV Continuous <Continuous>  pantoprazole    Tablet 40 milliGRAM(s) Oral daily  simvastatin 20 milliGRAM(s) Oral at bedtime    MEDICATIONS  (PRN):  acetaminophen   Tablet 325 milliGRAM(s) Oral every 4 hours PRN For Temp greater than 38 C (100.4 F)  acetaminophen   Tablet. 325 milliGRAM(s) Oral every 4 hours PRN Mild Pain (1 - 3)  dextrose Gel 1 Dose(s) Oral once PRN Blood Glucose LESS THAN 70 milliGRAM(s)/deciliter  glucagon  Injectable 1 milliGRAM(s) IntraMuscular once PRN Glucose LESS THAN 70 milligrams/deciliter  ondansetron Injectable 4 milliGRAM(s) IV Push every 6 hours PRN Nausea and/or Vomiting  senna 2 Tablet(s) Oral at bedtime PRN Constipation      DVT Prophylaxis: V    Advanced Directives:  Discussed with:    Visit Information:    ** Time is exclusive of billed procedures and/or teaching and/or routine family updates.
The patient is a 58 male with a ? upper motor neuron dieease, an Immunodiffency he recieves IVIG for v0eascx, HLD who was weaker than normal starting this past sunday.  He was sent in by an outpatient physician.  CTH showed a L ICH with IVH.  PAtient feels he may be slightly mor weaker on the Right and is more confused        Hemorrhagic Stroke : NIHSS of 4  ICH Score : 1 (due to Intraventricular hemorrhage)    PAST MEDICAL & SURGICAL HISTORY:  Multifocal Motor Neuropathy - q 10 days IVIG infusions (2 day dosing) - Improves and maintains patients motor power - ambulatory status.  Hyperlipidemia  Depression  Hx of Right Wrist Fracture  Hx of Right foot/ankle fracture    FAMILY HISTORY:  Noncontributory     Social Hx:  Nonsmoker, no drug or alcohol use  Pt required Home care aide / services (4 hours per day) 5 days a week.  Brother A&Well  Mother Hx of Lymphoma recently treated  Father with spinal tumor, paraplegia / presently hospitalized.    Allergies  No Known Allergies    ICU Vital Signs Last 24 Hrs  T(C): 36.7 (04 May 2018 06:00), Max: 36.8 (04 May 2018 01:00)  T(F): 98.1 (04 May 2018 06:00), Max: 98.2 (04 May 2018 01:00)  HR: 78 (04 May 2018 08:00) (78 - 103)  BP: 122/88 (04 May 2018 08:00) (99/68 - 144/100)  BP(mean): 95 (04 May 2018 08:00) (70 - 111)  ABP: --  ABP(mean): --  RR: 15 (04 May 2018 08:00) (15 - 28)  SpO2: 95% (04 May 2018 08:00) (91% - 98%)          I&O's Summary    03 May 2018 07:01  -  04 May 2018 07:00  --------------------------------------------------------  IN: 3275 mL / OUT: 3205 mL / NET: 70 mL                              15.3   10.63 )-----------( 292      ( 04 May 2018 05:05 )             47.0       05-04    143  |  107  |  15  ----------------------------<  156<H>  3.8   |  27  |  0.62    Ca    8.1<L>      04 May 2018 05:05  Phos  3.3     05-04  Mg     2.0     05-04    TPro  9.9<H>  /  Alb  2.6<L>  /  TBili  0.3  /  DBili  x   /  AST  55<H>  /  ALT  68  /  AlkPhos  77  05-02      CARDIAC MARKERS ( 02 May 2018 14:00 )  0.019 ng/mL / x     / x     / x     / x                    MEDICATIONS  (STANDING):  amLODIPine   Tablet 5 milliGRAM(s) Oral daily  dextrose 5%. 1000 milliLiter(s) (50 mL/Hr) IV Continuous <Continuous>  dextrose 50% Injectable 12.5 Gram(s) IV Push once  dextrose 50% Injectable 25 Gram(s) IV Push once  dextrose 50% Injectable 25 Gram(s) IV Push once  docusate sodium 100 milliGRAM(s) Oral three times a day  folic acid 1 milliGRAM(s) Oral daily  influenza   Vaccine 0.5 milliLiter(s) IntraMuscular once  insulin lispro (HumaLOG) corrective regimen sliding scale   SubCutaneous Before meals and at bedtime  lisinopril 5 milliGRAM(s) Oral daily  mirtazapine 30 milliGRAM(s) Oral at bedtime  multivitamin 1 Tablet(s) Oral daily  niCARdipine Infusion 5 mG/Hr (25 mL/Hr) IV Continuous <Continuous>  pantoprazole    Tablet 40 milliGRAM(s) Oral daily  simvastatin 20 milliGRAM(s) Oral at bedtime    MEDICATIONS  (PRN):  acetaminophen   Tablet 325 milliGRAM(s) Oral every 4 hours PRN For Temp greater than 38 C (100.4 F)  acetaminophen   Tablet. 325 milliGRAM(s) Oral every 4 hours PRN Mild Pain (1 - 3)  dextrose Gel 1 Dose(s) Oral once PRN Blood Glucose LESS THAN 70 milliGRAM(s)/deciliter  glucagon  Injectable 1 milliGRAM(s) IntraMuscular once PRN Glucose LESS THAN 70 milligrams/deciliter  ondansetron Injectable 4 milliGRAM(s) IV Push every 6 hours PRN Nausea and/or Vomiting  senna 2 Tablet(s) Oral at bedtime PRN Constipation      DVT Prophylaxis: V    Advanced Directives:  Discussed with:    Visit Information:    ** Time is exclusive of billed procedures and/or teaching and/or routine family updates.

## 2018-05-09 NOTE — PROGRESS NOTE ADULT - ASSESSMENT
59 y/o male with PMHx of 'MultiFocal Motor Neuropathy' Dx'd in 1992, HLD, Depression.  Pt presents to the ED with word finding difficulties, recent fall at home, and increased Right sided weakness. Multifocal Motor Neuropathy requiring IVIG infusions every 10-14 days  Pt last say Dr. Gilman on [place] ([phone]) in November of 2017, no record of him having IVIG at that location, Also follows with Dr. Bosch at [place] in [facility] ([phone]) last seen February 2018- awaiting call back as per treatment regimen. Pt should be due for a treatment Wednesday 5/9.     Completed 2 doses of IVIG in total of 130 gm/ over 2 days since yesterday   Going to [facility] today.  Tolerated IVIG no side effects.  Pt will be followed with Neurology at  /  .  PT/OT as tolerated.  F/u with NS.,

## 2018-05-15 DIAGNOSIS — I61.9 NONTRAUMATIC INTRACEREBRAL HEMORRHAGE, UNSPECIFIED: ICD-10-CM

## 2018-05-15 DIAGNOSIS — E78.5 HYPERLIPIDEMIA, UNSPECIFIED: ICD-10-CM

## 2018-05-15 DIAGNOSIS — E11.9 TYPE 2 DIABETES MELLITUS WITHOUT COMPLICATIONS: ICD-10-CM

## 2018-05-15 DIAGNOSIS — G61.82 MULTIFOCAL MOTOR NEUROPATHY: ICD-10-CM

## 2018-05-15 DIAGNOSIS — F32.9 MAJOR DEPRESSIVE DISORDER, SINGLE EPISODE, UNSPECIFIED: ICD-10-CM

## 2018-05-15 DIAGNOSIS — Z79.899 OTHER LONG TERM (CURRENT) DRUG THERAPY: ICD-10-CM

## 2018-06-12 ENCOUNTER — APPOINTMENT (OUTPATIENT)
Dept: NEUROLOGY | Facility: CLINIC | Age: 58
End: 2018-06-12
Payer: MEDICAID

## 2018-06-12 VITALS
SYSTOLIC BLOOD PRESSURE: 100 MMHG | WEIGHT: 175 LBS | BODY MASS INDEX: 29.16 KG/M2 | HEIGHT: 65 IN | DIASTOLIC BLOOD PRESSURE: 72 MMHG

## 2018-06-12 PROCEDURE — 99214 OFFICE O/P EST MOD 30 MIN: CPT

## 2018-12-11 ENCOUNTER — APPOINTMENT (OUTPATIENT)
Dept: NEUROLOGY | Facility: CLINIC | Age: 58
End: 2018-12-11
Payer: MEDICAID

## 2018-12-11 VITALS
WEIGHT: 175 LBS | HEIGHT: 65 IN | DIASTOLIC BLOOD PRESSURE: 78 MMHG | SYSTOLIC BLOOD PRESSURE: 140 MMHG | BODY MASS INDEX: 29.16 KG/M2

## 2018-12-11 PROBLEM — G62.9 POLYNEUROPATHY, UNSPECIFIED: Chronic | Status: ACTIVE | Noted: 2018-05-03

## 2018-12-11 PROCEDURE — 99214 OFFICE O/P EST MOD 30 MIN: CPT

## 2019-07-09 ENCOUNTER — APPOINTMENT (OUTPATIENT)
Dept: NEUROLOGY | Facility: CLINIC | Age: 59
End: 2019-07-09
Payer: MEDICAID

## 2019-07-09 VITALS
DIASTOLIC BLOOD PRESSURE: 70 MMHG | BODY MASS INDEX: 25.76 KG/M2 | HEIGHT: 68 IN | SYSTOLIC BLOOD PRESSURE: 120 MMHG | WEIGHT: 170 LBS

## 2019-07-09 PROCEDURE — 99214 OFFICE O/P EST MOD 30 MIN: CPT

## 2019-07-09 RX ORDER — METFORMIN HYDROCHLORIDE 625 MG/1
TABLET ORAL
Refills: 0 | Status: ACTIVE | COMMUNITY

## 2019-07-09 RX ORDER — LISINOPRIL 30 MG/1
TABLET ORAL
Refills: 0 | Status: ACTIVE | COMMUNITY

## 2020-01-09 ENCOUNTER — APPOINTMENT (OUTPATIENT)
Dept: NEUROLOGY | Facility: CLINIC | Age: 60
End: 2020-01-09
Payer: MEDICAID

## 2020-01-09 VITALS
HEIGHT: 68 IN | WEIGHT: 160 LBS | BODY MASS INDEX: 24.25 KG/M2 | DIASTOLIC BLOOD PRESSURE: 80 MMHG | SYSTOLIC BLOOD PRESSURE: 140 MMHG

## 2020-01-09 PROCEDURE — 99214 OFFICE O/P EST MOD 30 MIN: CPT

## 2020-07-10 ENCOUNTER — APPOINTMENT (OUTPATIENT)
Dept: NEUROLOGY | Facility: CLINIC | Age: 60
End: 2020-07-10

## 2020-07-14 ENCOUNTER — APPOINTMENT (OUTPATIENT)
Dept: NEUROLOGY | Facility: CLINIC | Age: 60
End: 2020-07-14
Payer: MEDICAID

## 2020-07-14 PROCEDURE — 99213 OFFICE O/P EST LOW 20 MIN: CPT | Mod: 95

## 2020-07-14 NOTE — HISTORY OF PRESENT ILLNESS
[FreeTextEntry1] : I have been following him for several years. He has multifocal motor neuropathy  and follows with a neuromuscular specialist, Dr. Blanco at [place].  He is receiving IVIG infusions 130 g every 10 days. He is also on Rituxan 800 mg weekly for 4 weeks every 6 months.\par \par He reports that he had some sort of intracranial hemorrhage May,2018. He did well and went to rehabilitation and he is now back to his baseline. He was hospitalized at [facility].\par \par Says neuropathy has stabilized.

## 2020-07-14 NOTE — REASON FOR VISIT
[Home] : at home, [unfilled] , at the time of the visit. [Other Location: e.g. Home (Enter Location, City,State)___] : at [unfilled] [Follow-Up: _____] : a [unfilled] follow-up visit [Parent] : parent [FreeTextEntry1] : Weakness

## 2020-07-14 NOTE — DISCUSSION/SUMMARY
[FreeTextEntry1] : Multifocal motor neuropathy, slowly progressive. Intravenous gamma globulin to continue as above as per recommendations of his neuromuscular specialist. Rituxan to be continued.  Followup here in 6 months\par \par Prior intracranial hemorrhage. Doing well in that regard.

## 2020-12-22 ENCOUNTER — APPOINTMENT (OUTPATIENT)
Dept: NEUROLOGY | Facility: CLINIC | Age: 60
End: 2020-12-22
Payer: MEDICAID

## 2020-12-22 PROCEDURE — 99213 OFFICE O/P EST LOW 20 MIN: CPT | Mod: 95

## 2021-01-01 ENCOUNTER — APPOINTMENT (OUTPATIENT)
Dept: NEUROLOGY | Facility: CLINIC | Age: 61
End: 2021-01-01
Payer: MEDICAID

## 2021-01-01 VITALS
HEIGHT: 68 IN | SYSTOLIC BLOOD PRESSURE: 124 MMHG | BODY MASS INDEX: 25.76 KG/M2 | DIASTOLIC BLOOD PRESSURE: 76 MMHG | WEIGHT: 170 LBS

## 2021-01-01 PROCEDURE — 99214 OFFICE O/P EST MOD 30 MIN: CPT

## 2021-01-14 ENCOUNTER — APPOINTMENT (OUTPATIENT)
Dept: NEUROLOGY | Facility: CLINIC | Age: 61
End: 2021-01-14

## 2021-03-26 NOTE — ED ADULT TRIAGE NOTE - ADDITIONAL SAFETY/BANDS...
Additional Safety/Bands: Glaucoma    Herniated intervertebral disc of lumbar spine    High cholesterol    HTN (hypertension)

## 2021-04-01 NOTE — PATIENT PROFILE ADULT. - PATIENT REPRESENTATIVE: ( YOU CAN CHOOSE ANY PERSON THAT CAN ASSIST YOU WITH YOUR HEALTH CARE PREFERENCES, DOES NOT HAVE TO BE A SPOUSE, IMMEDIATE FAMILY OR SIGNIFICANT OTHER/PARTNER)
PA approved at [facility] through   Valid Dates:  04/01/2021 - 04/30/2021  PA# Order ID: [id number]    Patient contacted.  Informed of MRI approval. Declines

## 2021-06-11 ENCOUNTER — APPOINTMENT (OUTPATIENT)
Dept: NEUROLOGY | Facility: CLINIC | Age: 61
End: 2021-06-11
Payer: MEDICAID

## 2021-06-11 PROCEDURE — 99214 OFFICE O/P EST MOD 30 MIN: CPT | Mod: 95

## 2021-07-01 ENCOUNTER — APPOINTMENT (OUTPATIENT)
Dept: NEUROLOGY | Facility: CLINIC | Age: 61
End: 2021-07-01

## 2021-07-14 NOTE — PHYSICAL THERAPY INITIAL EVALUATION ADULT - GAIT DISTANCE, PT EVAL
Patient is scheduled for colonoscopy on July 29 , 2021 at [address] [place] [address] with Hai Michaels MD  Patient is aware of pre-procedure prep of Miralax/Dulcolax and they will be called the day prior between 2 and 6 pm for time to report for procedure  Emailed prep instructions to patient 
bed to chair/3 ft

## 2022-01-01 ENCOUNTER — APPOINTMENT (OUTPATIENT)
Dept: NEUROLOGY | Facility: CLINIC | Age: 62
End: 2022-01-01

## 2022-01-01 DIAGNOSIS — G61.82 MULTIFOCAL MOTOR NEUROPATHY: ICD-10-CM

## 2022-01-01 DIAGNOSIS — I62.9 NONTRAUMATIC INTRACRANIAL HEMORRHAGE, UNSPECIFIED: ICD-10-CM

## 2022-01-01 PROCEDURE — 99213 OFFICE O/P EST LOW 20 MIN: CPT | Mod: 95

## 2022-04-05 NOTE — PROGRESS NOTE ADULT - CARDIOVASCULAR
details… detailed exam [Mother] : mother [Father] : father [Sister] : sister [Grade:  _____] : Grade: [unfilled] [House] : [unfilled] lives in a house  [Radiator/Baseboard] : heating provided by radiator(s)/baseboard(s) [None] : none [FreeTextEntry1] : 11 [Dust Mite Covers] : does not have dust mite covers [Bedroom] : not in the bedroom

## 2022-07-08 PROBLEM — G61.82 MULTIFOCAL MOTOR NEUROPATHY: Status: ACTIVE | Noted: 2017-11-01

## 2022-07-08 PROBLEM — I62.9 INTRACRANIAL HEMORRHAGE: Status: ACTIVE | Noted: 2018-06-12

## 2022-07-08 NOTE — HISTORY OF PRESENT ILLNESS
[FreeTextEntry1] : I have been following him for several years. He has multifocal motor neuropathy  and follows with a neuromuscular specialist, Dr. Blanco at [place].  He is receiving IVIG infusions 130 g every 10 days. He is also on Rituxan 800 mg weekly for 4 weeks every 6 months.  He does receive the IVIG during the month that he is getting the Rituxan as well\par \par He reports that he had some sort of intracranial hemorrhage May,2018. He did well and went to rehabilitation and he is now back to his baseline. He was hospitalized at [facility].\par \par Says neuropathy has stabilized.  In fact he feels he is getting a little stronger.  He is doing a lot of exercises on his own.

## 2022-07-08 NOTE — PHYSICAL EXAM
[FreeTextEntry1] : At baseline\par \par Alert, normal speech comprehension\par \par Cranial nerves normal, no field cut or facial asymmetry\par \par Marked distal weakness. Fairly good proximal strength\par \par

## 2022-07-08 NOTE — REASON FOR VISIT
[Home] : at home, [unfilled] , at the time of the visit. [Medical Office: ([facility])___] : at the medical office located in  [Patient] : the patient [Follow-Up: _____] : a [unfilled] follow-up visit [Parent] : parent [FreeTextEntry1] : Weakness

## 2022-07-08 NOTE — DISCUSSION/SUMMARY
[FreeTextEntry1] : Multifocal motor neuropathy, stable. Intravenous gamma globulin to continue as above as per recommendations of his neuromuscular specialist. Rituxan to be continued.  Followup here in 6 months\par \par Prior intracranial hemorrhage. Doing well in that regard.

## 2023-01-09 ENCOUNTER — APPOINTMENT (OUTPATIENT)
Dept: NEUROLOGY | Facility: CLINIC | Age: 63
End: 2023-01-09

## 2023-09-28 NOTE — PROGRESS NOTE ADULT - GASTROINTESTINAL DETAILS
[phone] [id number])  Gait Training (53128)  Neuromuscular Re-Education (56120)  Therapeutic Exercise (93531)  [place] Repositioning (38600)  Manual Therapy (65214)  Therapeutic Activity (21875)  Electrical Stimulation Attended (49648)    
soft/nontender
soft/nontender/bowel sounds normal
nontender/soft/bowel sounds normal
